# Patient Record
Sex: FEMALE | Race: WHITE | NOT HISPANIC OR LATINO | Employment: OTHER | URBAN - NONMETROPOLITAN AREA
[De-identification: names, ages, dates, MRNs, and addresses within clinical notes are randomized per-mention and may not be internally consistent; named-entity substitution may affect disease eponyms.]

---

## 2017-03-23 RX ORDER — PANTOPRAZOLE SODIUM 40 MG/1
TABLET, DELAYED RELEASE ORAL
Qty: 30 TABLET | Refills: 11 | OUTPATIENT
Start: 2017-03-23

## 2017-06-21 ENCOUNTER — OFFICE VISIT (OUTPATIENT)
Dept: GASTROENTEROLOGY | Facility: CLINIC | Age: 72
End: 2017-06-21

## 2017-06-21 VITALS
SYSTOLIC BLOOD PRESSURE: 142 MMHG | TEMPERATURE: 97.6 F | WEIGHT: 142 LBS | HEART RATE: 63 BPM | BODY MASS INDEX: 26.13 KG/M2 | DIASTOLIC BLOOD PRESSURE: 68 MMHG | HEIGHT: 62 IN | OXYGEN SATURATION: 98 %

## 2017-06-21 DIAGNOSIS — K22.2 SCHATZKI'S RING: ICD-10-CM

## 2017-06-21 DIAGNOSIS — Z12.11 COLON CANCER SCREENING: Primary | ICD-10-CM

## 2017-06-21 DIAGNOSIS — R13.19 OTHER DYSPHAGIA: ICD-10-CM

## 2017-06-21 PROBLEM — R13.10 DYSPHAGIA: Status: ACTIVE | Noted: 2017-06-21

## 2017-06-21 PROCEDURE — 99213 OFFICE O/P EST LOW 20 MIN: CPT | Performed by: NURSE PRACTITIONER

## 2017-06-21 RX ORDER — SODIUM, POTASSIUM,MAG SULFATES 17.5-3.13G
2 SOLUTION, RECONSTITUTED, ORAL ORAL ONCE
Qty: 2 BOTTLE | Refills: 0 | Status: SHIPPED | OUTPATIENT
Start: 2017-06-21 | End: 2017-06-21

## 2017-06-21 RX ORDER — LISINOPRIL 10 MG/1
20 TABLET ORAL
COMMUNITY
Start: 2016-12-22 | End: 2021-06-02

## 2017-06-21 RX ORDER — ROSUVASTATIN CALCIUM 20 MG/1
20 TABLET, COATED ORAL
COMMUNITY
Start: 2017-05-15 | End: 2021-06-02

## 2017-06-21 RX ORDER — AMLODIPINE BESYLATE 5 MG/1
5 TABLET ORAL
COMMUNITY
Start: 2017-01-27 | End: 2018-01-28

## 2017-06-21 RX ORDER — ESCITALOPRAM OXALATE 10 MG/1
TABLET ORAL
COMMUNITY
Start: 2017-04-25 | End: 2018-08-27

## 2017-06-21 NOTE — PROGRESS NOTES
"Chief Complaint   Patient presents with   • Colon Cancer Screening     Patient is here today for colon screening and she is starting to havinf trouble swallowing.   • Difficulty Swallowing     Subjective   HPI  Arlene England is a 71 y.o. female who presents as a referral for preventative maintenance. She has no complaints of nausea or vomiting. No change in bowels. No wt loss. No BRBPR. No melena. The patient is adopted and does not know family history.  No abdominal pain. The patient last colonoscopy was in [place] 2012 normal.    The patient does tell me additionally she has been having difficulty swallowing again.  According to records on 3/14/16 the patient underwent an upper endoscopy for dysphagia after having been seen in the ER with a recent food impaction that resolved with glucagon.  She was found to have a moderate Schatzki ring that was dilated and a small hiatal hernia.  The patient states that her symptoms of feeling as though food gets \"stuck\" in her mid esophageal area most specifically with food such as solids.  She denies any nausea, vomiting, epigastric pain, pyrosis, hematemesis or odynophagia.    Past Medical History:   Diagnosis Date   • Dysphagia      Past Surgical History:   Procedure Laterality Date   • CHOLECYSTECTOMY     • HYSTERECTOMY     • TOTAL KNEE ARTHROPLASTY     • UPPER GASTROINTESTINAL ENDOSCOPY  03/14/2016       Current Outpatient Prescriptions:   •  amLODIPine (NORVASC) 5 MG tablet, Take 5 mg by mouth., Disp: , Rfl:   •  escitalopram (LEXAPRO) 10 MG tablet, TAKE ONE TABLET BY MOUTH ONCE DAILY, Disp: , Rfl:   •  lisinopril (PRINIVIL,ZESTRIL) 10 MG tablet, Take 20 mg by mouth., Disp: , Rfl:   •  rosuvastatin (CRESTOR) 20 MG tablet, Take 20 mg by mouth., Disp: , Rfl:   •  sodium-potassium-magnesium sulfates (SUPREP BOWEL PREP KIT) 17.5-3.13-1.6 GM/180ML solution oral solution, Take 2 bottles by mouth 1 (One) Time for 1 dose. Split dose prep as directed by office instructions " "provided.  2 bottles = one kit., Disp: 2 bottle, Rfl: 0  Allergies   Allergen Reactions   • Sulfa Antibiotics Rash     Social History     Social History   • Marital status:      Spouse name: N/A   • Number of children: N/A   • Years of education: N/A     Occupational History   • Not on file.     Social History Main Topics   • Smoking status: Never Smoker   • Smokeless tobacco: Never Used   • Alcohol use Yes   • Drug use: Not on file   • Sexual activity: Not on file     Other Topics Concern   • Not on file     Social History Narrative     History reviewed. No pertinent family history.    REVIEW OF SYSTEMS  General: well appearing, no fever chills or sweats, no unexplained wt loss  HEENT: no acute visual or hearing disturbances  Cardiovascular: No chest pain or palpitations  Pulmonary: No shortness of breath, coughing, wheezing or hemoptysis  : No burning, urgency, hematuria, or dysuria  Musculoskeletal: No joint pain or stiffness  Peripheral: no edema  Skin: No lesions or rashes  Neuro: No dizziness, headaches, stroke, syncope  Endocrine: No hot or cold intolerances  Hematological: No blood dyscrasias    Objective   Vitals:    06/21/17 1442   BP: 142/68   Pulse: 63   Temp: 97.6 °F (36.4 °C)   SpO2: 98%   Weight: 142 lb (64.4 kg)   Height: 62\" (157.5 cm)     Body mass index is 25.97 kg/(m^2).    PHYSICAL EXAM  General: age appropriate well nourished well appearing, no acute distress  Head: normocephalic and atraumatic  Global assessment-supple  Neck-No JVD noted, no lymphadenopathy  Pulmonary-clear to auscultation bilaterally, normal respiratory effort  Cardiovascular-normal rate and rhythm, normal heart sounds, S1 and S2 noted  Abdomen-soft, non tender, non distended, normal bowel sounds all 4 quadrants, no hepatosplenomegaly noted  Extremities-No clubbing cyanosis or edema  Neuro-Non focal, converses appropriately, awake, alert, oriented    Imaging Results (most recent)     None        Assessment/Plan "   Arlene was seen today for colon cancer screening and difficulty swallowing.    Diagnoses and all orders for this visit:    Colon cancer screening  -     Case Request; Standing  -     Case RequestColonoscopy    Other dysphagia  -     Case Request; Standing  -     Case Request upper endoscopy  I have discussed with the patient to avoid foods that are solid such as meat and bread.  I have urged her to eat small amounts.    Chester's ring  Comments:  Hx of 2016 with food impaction  Orders:  -     Case Request; Standing  -     Case Request    Other orders  -     Implement Anesthesia Orders Day of Procedure; Standing  -     Obtain Informed Consent; Standing  -     Verify bowel prep was successful; Standing  -     sodium-potassium-magnesium sulfates (SUPREP BOWEL PREP KIT) 17.5-3.13-1.6 GM/180ML solution oral solution; Take 2 bottles by mouth 1 (One) Time for 1 dose. Split dose prep as directed by office instructions provided.  2 bottles = one kit.      ESOPHAGOGASTRODUODENOSCOPY WITH ANESTHESIA (N/A), COLONOSCOPY WITH ANESTHESIA (N/A)  No Follow-up on file.  There are no Patient Instructions on file for this visit.    All risks, benefits, alternatives, and indications of colonoscopy procedure have been discussed with the patient. Risks to include perforation of the colon requiring possible surgery or colostomy, risk of bleeding from biopsies or removal of colon tissue, possibility of missing a colon polyp or cancer, or adverse drug reaction.  Benefits to include the diagnosis and management of disease of the colon and rectum. Alternatives to include barium enema, radiographic evaluation, lab testing or no intervention. Pt verbalizes understanding and agrees.     The risks, benefits, and alternatives of endoscopy were reviewed with the patient today.  Risks including perforation, with or without dilation, possibly requiring surgery.  Additional risks include risk of bleeding.  There is also the risk of a drug reaction  or problems with anesthesia.  This will be discussed with the further by the anesthesia team on the day of the procedure. The benefits include the diagnosis and management of disease of the upper digestive tract.  Alternatives to endoscopy include upper GI series, laboratory testing, radiographic evaluation, or no intervention.  The patient verbalizes understanding and agrees.

## 2017-08-14 ENCOUNTER — HOSPITAL ENCOUNTER (OUTPATIENT)
Facility: HOSPITAL | Age: 72
Setting detail: HOSPITAL OUTPATIENT SURGERY
Discharge: HOME | End: 2017-08-14
Attending: INTERNAL MEDICINE | Admitting: INTERNAL MEDICINE

## 2017-08-14 ENCOUNTER — ANESTHESIA (OUTPATIENT)
Dept: GASTROENTEROLOGY | Facility: HOSPITAL | Age: 72
End: 2017-08-14

## 2017-08-14 ENCOUNTER — ANESTHESIA EVENT (OUTPATIENT)
Dept: GASTROENTEROLOGY | Facility: HOSPITAL | Age: 72
End: 2017-08-14

## 2017-08-14 ENCOUNTER — TELEPHONE (OUTPATIENT)
Dept: GASTROENTEROLOGY | Facility: CLINIC | Age: 72
End: 2017-08-14

## 2017-08-14 VITALS
OXYGEN SATURATION: 100 % | RESPIRATION RATE: 19 BRPM | TEMPERATURE: 98.3 F | DIASTOLIC BLOOD PRESSURE: 40 MMHG | HEART RATE: 63 BPM | HEIGHT: 62 IN | SYSTOLIC BLOOD PRESSURE: 118 MMHG

## 2017-08-14 PROCEDURE — C1726 CATH, BAL DIL, NON-VASCULAR: HCPCS | Performed by: INTERNAL MEDICINE

## 2017-08-14 PROCEDURE — G0105 COLORECTAL SCRN; HI RISK IND: HCPCS | Performed by: INTERNAL MEDICINE

## 2017-08-14 PROCEDURE — 43249 ESOPH EGD DILATION <30 MM: CPT | Performed by: INTERNAL MEDICINE

## 2017-08-14 PROCEDURE — 25010000002 PROPOFOL 10 MG/ML EMULSION: Performed by: NURSE ANESTHETIST, CERTIFIED REGISTERED

## 2017-08-14 RX ORDER — PROPOFOL 10 MG/ML
VIAL (ML) INTRAVENOUS AS NEEDED
Status: DISCONTINUED | OUTPATIENT
Start: 2017-08-14 | End: 2017-08-14 | Stop reason: SURG

## 2017-08-14 RX ORDER — SODIUM CHLORIDE 0.9 % (FLUSH) 0.9 %
3 SYRINGE (ML) INJECTION AS NEEDED
Status: DISCONTINUED | OUTPATIENT
Start: 2017-08-14 | End: 2017-08-14 | Stop reason: HOSPADM

## 2017-08-14 RX ORDER — LIDOCAINE HYDROCHLORIDE 10 MG/ML
0.5 INJECTION, SOLUTION INFILTRATION; PERINEURAL ONCE AS NEEDED
Status: DISCONTINUED | OUTPATIENT
Start: 2017-08-14 | End: 2017-08-14 | Stop reason: CLARIF

## 2017-08-14 RX ORDER — ASPIRIN 325 MG
325 TABLET ORAL DAILY
COMMUNITY
End: 2021-06-02

## 2017-08-14 RX ORDER — SODIUM CHLORIDE 9 MG/ML
500 INJECTION, SOLUTION INTRAVENOUS CONTINUOUS PRN
Status: DISCONTINUED | OUTPATIENT
Start: 2017-08-14 | End: 2017-08-14 | Stop reason: HOSPADM

## 2017-08-14 RX ORDER — LIDOCAINE HYDROCHLORIDE 20 MG/ML
INJECTION, SOLUTION INFILTRATION; PERINEURAL AS NEEDED
Status: DISCONTINUED | OUTPATIENT
Start: 2017-08-14 | End: 2017-08-14 | Stop reason: SURG

## 2017-08-14 RX ADMIN — PROPOFOL 50 MG: 10 INJECTION, EMULSION INTRAVENOUS at 11:40

## 2017-08-14 RX ADMIN — PROPOFOL 30 MG: 10 INJECTION, EMULSION INTRAVENOUS at 12:04

## 2017-08-14 RX ADMIN — PROPOFOL 30 MG: 10 INJECTION, EMULSION INTRAVENOUS at 12:06

## 2017-08-14 RX ADMIN — PROPOFOL 30 MG: 10 INJECTION, EMULSION INTRAVENOUS at 11:47

## 2017-08-14 RX ADMIN — SODIUM CHLORIDE 500 ML: 9 INJECTION, SOLUTION INTRAVENOUS at 11:19

## 2017-08-14 RX ADMIN — PROPOFOL 30 MG: 10 INJECTION, EMULSION INTRAVENOUS at 11:49

## 2017-08-14 RX ADMIN — PROPOFOL 30 MG: 10 INJECTION, EMULSION INTRAVENOUS at 11:52

## 2017-08-14 RX ADMIN — PROPOFOL 30 MG: 10 INJECTION, EMULSION INTRAVENOUS at 11:54

## 2017-08-14 RX ADMIN — SODIUM CHLORIDE: 9 INJECTION, SOLUTION INTRAVENOUS at 11:36

## 2017-08-14 RX ADMIN — PROPOFOL 30 MG: 10 INJECTION, EMULSION INTRAVENOUS at 12:02

## 2017-08-14 RX ADMIN — PROPOFOL 30 MG: 10 INJECTION, EMULSION INTRAVENOUS at 11:48

## 2017-08-14 RX ADMIN — PROPOFOL 40 MG: 10 INJECTION, EMULSION INTRAVENOUS at 11:44

## 2017-08-14 RX ADMIN — PROPOFOL 30 MG: 10 INJECTION, EMULSION INTRAVENOUS at 11:58

## 2017-08-14 RX ADMIN — PROPOFOL 30 MG: 10 INJECTION, EMULSION INTRAVENOUS at 11:50

## 2017-08-14 RX ADMIN — PROPOFOL 30 MG: 10 INJECTION, EMULSION INTRAVENOUS at 11:43

## 2017-08-14 RX ADMIN — PROPOFOL 30 MG: 10 INJECTION, EMULSION INTRAVENOUS at 11:56

## 2017-08-14 RX ADMIN — PROPOFOL 30 MG: 10 INJECTION, EMULSION INTRAVENOUS at 11:45

## 2017-08-14 RX ADMIN — PROPOFOL 30 MG: 10 INJECTION, EMULSION INTRAVENOUS at 12:00

## 2017-08-14 RX ADMIN — PROPOFOL 30 MG: 10 INJECTION, EMULSION INTRAVENOUS at 11:46

## 2017-08-14 RX ADMIN — PROPOFOL 50 MG: 10 INJECTION, EMULSION INTRAVENOUS at 11:42

## 2017-08-14 RX ADMIN — LIDOCAINE HYDROCHLORIDE 40 MG: 20 INJECTION, SOLUTION INFILTRATION; PERINEURAL at 11:40

## 2017-08-14 RX ADMIN — PROPOFOL 30 MG: 10 INJECTION, EMULSION INTRAVENOUS at 11:51

## 2017-08-14 NOTE — ANESTHESIA PREPROCEDURE EVALUATION
Anesthesia Evaluation     Patient summary reviewed   no history of anesthetic complications:  NPO Solid Status: > 8 hours  NPO Liquid Status: > 8 hours     Airway   Mallampati: II  TM distance: >3 FB  Neck ROM: full  Dental - normal exam     Pulmonary - negative pulmonary ROS   (-) asthma, sleep apnea  Cardiovascular   Exercise tolerance: good (4-7 METS)    (+) hypertension, hyperlipidemia  (-) angina      Neuro/Psych  (+) CVA (10/2016, on residual),    (-) seizures, TIA  GI/Hepatic/Renal/Endo    (-) liver disease, no renal disease, diabetes    Musculoskeletal     Abdominal    Substance History      OB/GYN          Other                                        Anesthesia Plan    ASA 3     general   total IV anesthesia  intravenous induction   Anesthetic plan and risks discussed with patient.

## 2017-08-14 NOTE — H&P
"    Chief Complaint:   Dysphagia and likely history of colon polyps    Subjective     HPI:   The patient has a history of dysphagia.  She has been dilated in the past.  She is now having symptoms once again.    She also had a colonoscopy done at outside institution in 2015.  They called her for repeat colonoscopy.  This makes me suspect that she had colon polyps.  The patient admits that she has had a light stroke and has problems with her memory.    Past Medical History:   Past Medical History:   Diagnosis Date   • Dysphagia        Past Surgical History:  [unfilled]    Family History:  History reviewed. No pertinent family history.    Social History:   reports that she has never smoked. She has never used smokeless tobacco. She reports that she drinks alcohol.    Medications:   Prescriptions Prior to Admission   Medication Sig Dispense Refill Last Dose   • amLODIPine (NORVASC) 5 MG tablet Take 5 mg by mouth.   8/14/2017 at 0700   • aspirin 325 MG tablet Take 325 mg by mouth Daily.   8/13/2017 at 0700   • escitalopram (LEXAPRO) 10 MG tablet TAKE ONE TABLET BY MOUTH ONCE DAILY   8/14/2017 at 0700   • lisinopril (PRINIVIL,ZESTRIL) 10 MG tablet Take 20 mg by mouth.   8/14/2017 at 0700   • rosuvastatin (CRESTOR) 20 MG tablet Take 20 mg by mouth.   8/13/2017 at 2100       Allergies:  Sulfa antibiotics    ROS:    General: Weight stable  Resp: No SOA  Cardiovascular: No CP      Objective     /66 (BP Location: Right arm, Patient Position: Sitting)  Pulse 59  Temp 98.3 °F (36.8 °C) (Temporal Artery )   Resp 20  Ht 62\" (157.5 cm)  LMP Comment: hysterectomy  SpO2 99%    Physical Exam   Constitutional: Pt is oriented to person, place, and in no distress.  Eyes: No icterus  ENMT: Unremarkable   Cardiovascular: Normal rate, regular rhythm.    Pulmonary/Chest: No distress.  No audible wheezes  Abdominal: Soft.   Skin: Skin is warm and dry.   Psychiatric: Mood, memory, affect and judgment appear normal.     Assessment/Plan "     Diagnosis:  Dysphagia  History of colon polyps    Anticipated Surgical Procedure:  Endoscopy and colonoscopy    The risks, benefits, and alternatives of endoscopy were reviewed with the patient today.  Risks including perforation, with or without dilation, possibly requiring surgery.  Additional risks include risk of bleeding.  There is also the risk of a drug reaction or problems with anesthesia.  This will be discussed with the further by the anesthesia team on the day of the procedure. The benefits include the diagnosis and management of disease of the upper digestive tract.  Alternatives to endoscopy include upper GI series, laboratory testing, radiographic evaluation, or no intervention.  The patient verbalizes understanding and agrees.    The risks, benefits, and alternatives of colonoscopy were reviewed with the patient today.  Risks including perforation of the colon possibly requiring surgery or colostomy.  Additional risks include risk of bleeding from biopsies or removal of colon tissue.  There is also the risk of a drug reaction or problems with anesthesia.  This will be discussed with the further by the anesthesia team on the day of the procedure.  Lastly there is a possibility of missing a colon polyp or cancer.  The benefits include the diagnosis and management of disease of the colon and rectum.  Alternatives to colonoscopy include barium enema, laboratory testing, radiographic evaluation, or no intervention.  The patient verbalizes understanding and agrees.    Much of this encounter note is an electronic transcription/translation of spoken language to printed text. The electronic translation of spoken language may permit erroneous, or at times, nonsensical words or phrases to be inadvertently transcribed; although I have reviewed the note for such errors, some may still exist.

## 2017-08-14 NOTE — TELEPHONE ENCOUNTER
Please work with the patient on getting records from Dr. Lovell office regarding her last colonoscopy.  If there were no polyps, then recall colonoscopy in 10 years.  There were polyps, then repeat colonoscopy in 5 years.    Lindsay Blair MD

## 2017-08-14 NOTE — ANESTHESIA POSTPROCEDURE EVALUATION
Patient: Arlene England    Procedure Summary     Date Anesthesia Start Anesthesia Stop Room / Location    08/14/17 1136 1204 [facility] ENDOSCOPY 5 / [facility]       Procedure Diagnosis Surgeon Provider    ESOPHAGOGASTRODUODENOSCOPY WITH ANESTHESIA (N/A Esophagus); COLONOSCOPY WITH ANESTHESIA (N/A ) Schatzki's ring; Other dysphagia; Colon cancer screening  (Schatzki's ring [K22.2]; Other dysphagia [R13.19]; Colon cancer screening [Z12.11]) MD Eze Durbin CRNA          Anesthesia Type: general  Last vitals  BP        Temp        Pulse       Resp        SpO2          Post Anesthesia Care and Evaluation    Patient location during evaluation: PHASE II  Patient participation: complete - patient participated  Level of consciousness: awake  Pain score: 0  Pain management: adequate  Airway patency: patent  Anesthetic complications: No anesthetic complications  PONV Status: none  Cardiovascular status: acceptable  Respiratory status: acceptable  Hydration status: acceptable

## 2017-08-14 NOTE — PLAN OF CARE
Problem: Patient Care Overview (Adult)  Goal: Plan of Care Review  Outcome: Outcome(s) achieved Date Met:  08/14/17 08/14/17 1234   Coping/Psychosocial Response Interventions   Plan Of Care Reviewed With patient;spouse   Patient Care Overview   Progress progress toward functional goals as expected   Outcome Evaluation   Outcome Summary/Follow up Plan d/c criteria met

## 2017-08-14 NOTE — PLAN OF CARE
Problem: GI Endoscopy (Adult)  Goal: Signs and Symptoms of Listed Potential Problems Will be Absent or Manageable (GI Endoscopy)  Outcome: Outcome(s) achieved Date Met:  08/14/17

## 2017-08-14 NOTE — TELEPHONE ENCOUNTER
Her last colonoscopy was done 12/2/2013 and there was 1 tubular adenoma negative for dysplasia. The report and path has been faxed to us.     I put recall in system for 5 years    [Dr. Boateng [phone], [facility]]

## 2018-07-03 ENCOUNTER — OFFICE VISIT (OUTPATIENT)
Dept: NEUROSURGERY | Age: 73
End: 2018-07-03
Payer: MEDICARE

## 2018-07-03 VITALS
WEIGHT: 151 LBS | SYSTOLIC BLOOD PRESSURE: 156 MMHG | BODY MASS INDEX: 27.79 KG/M2 | HEIGHT: 62 IN | DIASTOLIC BLOOD PRESSURE: 62 MMHG | HEART RATE: 70 BPM

## 2018-07-03 DIAGNOSIS — Z86.73 HISTORY OF CVA (CEREBROVASCULAR ACCIDENT): Primary | ICD-10-CM

## 2018-07-03 PROBLEM — K22.2 SCHATZKI'S RING: Status: ACTIVE | Noted: 2017-06-21

## 2018-07-03 PROBLEM — Z12.11 COLON CANCER SCREENING: Status: ACTIVE | Noted: 2017-06-21

## 2018-07-03 PROBLEM — R13.10 DYSPHAGIA: Status: ACTIVE | Noted: 2017-06-21

## 2018-07-03 PROCEDURE — 99204 OFFICE O/P NEW MOD 45 MIN: CPT | Performed by: NURSE PRACTITIONER

## 2018-07-03 RX ORDER — ROSUVASTATIN CALCIUM 20 MG/1
20 TABLET, COATED ORAL DAILY
COMMUNITY
Start: 2018-06-05

## 2018-07-03 RX ORDER — ASPIRIN 325 MG
325 TABLET ORAL DAILY
COMMUNITY
End: 2018-11-01 | Stop reason: DRUGHIGH

## 2018-07-03 RX ORDER — ESCITALOPRAM OXALATE 10 MG/1
10 TABLET ORAL DAILY
COMMUNITY
Start: 2017-08-24

## 2018-07-03 RX ORDER — LISINOPRIL 10 MG/1
20 TABLET ORAL DAILY
COMMUNITY
Start: 2017-11-27 | End: 2020-03-03 | Stop reason: DRUGHIGH

## 2018-07-03 NOTE — PROGRESS NOTES
[facility] Neurology Office Note      Patient:   Verner Gust  MR#:    [id number]  Account Number:                         YOB: 1945  Date of Evaluation:  7/3/2018  Time of Note:                          3:28 PM  Primary/Referring Physician:  Solo Mccloud   Consulting Physician:  BARRY Chauhan    NEW PATIENT CONSULTATION    Chief Complaint   Patient presents with    New Patient     Ref. Dr Chana Patel Transient Ischemic Attack     Pt states she had 2 previous strokes in 2016, Pt is needing to establish care, no previous f/u from stroke.  Memory Loss     Pt and family states worsening memory since strokes. HISTORY OF PRESENT ILLNESS    Verner Gust is a 67y.o. year old female here to establish care. Prior history with TIA in 2016. She reports symptoms included right sided weakness and slurred speech. Symptoms resolved over several hours. No residual noted. No other stroke like symptoms noted since. Denies heart racing or palpitations. Has noticed short term memory loss. Difficulty with remembering names, forgets where she puts things down. No overt issues with ADLs, handles finances without difficulty, manages own medications. No urinary incontinence or gait changes. Checks blood pressure every morning and it usually runs normal. Family history with aunt having dementia. No history of atrial fibrillation. Past Medical History:   Diagnosis Date    Anxiety     High cholesterol     HTN (hypertension)     TIA (transient ischemic attack)        Past Surgical History:   Procedure Laterality Date    CATARACT REMOVAL WITH IMPLANT      CHOLECYSTECTOMY      JOINT REPLACEMENT Bilateral     PARTIAL HYSTERECTOMY         History reviewed. No pertinent family history. Social History     Social History    Marital status:      Spouse name: N/A    Number of children: N/A    Years of education: N/A     Occupational History    Not on file.      Social History Main Topics    Smoking status: Never Smoker    Smokeless tobacco: Never Used    Alcohol use Yes      Comment: occ    Drug use: No    Sexual activity: Not on file     Other Topics Concern    Not on file     Social History Narrative    No narrative on file       Current Outpatient Prescriptions   Medication Sig Dispense Refill    aspirin 325 MG tablet Take 325 mg by mouth daily      rosuvastatin (CRESTOR) 20 MG tablet Take 20 mg by mouth daily      escitalopram (LEXAPRO) 10 MG tablet Take 10 mg by mouth daily      lisinopril (PRINIVIL;ZESTRIL) 10 MG tablet Take 20 mg by mouth daily       No current facility-administered medications for this visit.         Allergies   Allergen Reactions    Sulfa Antibiotics Rash         REVIEW OF SYSTEMS  Constitutional: []Fever []Sweat []Chills [] Recent Injury [x] Denies all unless marked  HEENT:[]Headache  [] Head Injury/Hearing Loss  [] Sore Throat  [] Ear Ache/Dizziness  [x] Denies all unless marked  Spine:  [] Neck pain  [x] Back pain  [] Sciaticia  [x] Denies all unless marked  Cardiovascular:[]Heart Disease []Chest Pain [] Palpitations  [x] Denies all unless marked  Pulmonary: []Shortness of Breath []Cough   [x] Denies all unless marke  Gastrointestinal: []Nausea  []Vomiting  []Abdominal Pain  []Constipation  []Diarrhea  []Dark Bloody Stools  [x] Denies all unless marked  Psychiatric/Behavioral:[] Depression [] Anxiety [x] Denies all unless marked  Genitourinary:   [] Frequency  [] Urgency  [] Incontinence [] Pain with Urination  [x] Denies all unless marked  Extremities: []Pain  []Swelling  [x] Denies all unless marked  Musculoskeletal: [] Muscle Pain  [] Joint Pain  [] Arthritis [] Muscle Cramps [] Muscle Twitches  [x] Denies all unless marked  Sleep: [] Insomnia [] Snoring [] Restless Legs [] Sleep Apnea  [] Daytime Sleepiness  [x] Denies all unless marked  Skin:[] Rash [] Skin Discoloration [x] Denies all unless marked   Neurological: [x]Visual Disturbance/Memory Loss [x] Loss of Balance [] Slurred Speech/Weakness [] Seizures  [] Vertigo/Dizziness [x] Denies all unless marked    The MA has completed the ROS with the patient. I have reviewed it in its' entirety with the patient and agree with the documentation. PHYSICAL EXAM  BP (!) 156/62   Pulse 70   Ht 5' 2\" (1.575 m)   Wt 151 lb (68.5 kg)   BMI 27.62 kg/m²       Constitutional  No acute distress    HEENT- Conjunctiva normal.  No scars, masses, or lesions over external nose or ears, no neck masses noted, no jugular vein distension, no bruit  Pulmonary- Good expansion, normal effort without use of accessory muscles  Musculoskeletal  No significant wasting of muscles noted, no bony deformities  Extremities - No clubbing, cyanosis or edema  Skin  Warm, dry, and intact. No rash, erythema, or pallor  Psychiatric  Mood, affect, and behavior appear normal      NEUROLOGICAL EXAM     Mental status   [x]Awake, alert, oriented   [x]Affect attention and concentration appear appropriate  [x]Recent and remote memory appears unremarkable  [x]Speech normal without dysarthria or aphasia, comprehension and repetition intact. COMMENTS: MoCA 27/30    Cranial Nerves [x]No VF deficit to confrontation,  no papilledema on fundoscopic exam.  [x]PERRLA, EOMI, no nystagmus, conjugate eye movements, no ptosis  [x]Face symmetric  [x]Facial sensation intact  [x]Tongue midline no atrophy or fasciculations present  [x]Palate midline, hearing to finger rub normal bilaterally  [x]Shoulder shrug and SCM testing normal bilaterally  COMMENTS:   Motor   [x]5/5 strength x 4 extremities  [x]Normal bulk and tone  [x]No tremor present  [x]No rigidity or bradykinesia noted  COMMENTS:   Sensory  [x]Sensation intact to light touch, pin prick, vibration, and proprioception BLE  [x]Sensation intact to light touch, pin prick, vibration, and proprioception BUE  COMMENTS:   Coordination [x]FTN normal bilaterally   [x]HTS normal bilaterally  [x]JORDY normal bilaterally. COMMENTS:   Reflexes  [x]Symmetric and non-pathological  [x]Toes down going bilaterally  [x]No clonus present  COMMENTS:   Gait                  [x]Normal steady gait    []Ataxic    []Spastic     []Magnetic     []Shuffling  COMMENTS:       LABS RECORD AND IMAGING REVIEW (As below and per HPI)    No results found for: [id number]  No results found for: WBC, HGB, HCT, MCV, PLT  No results found for: NA, K, CL, CO2, BUN, CREATININE, GLUCOSE, CALCIUM, PROT, LABALBU, BILITOT, ALKPHOS, AST, ALT, LABGLOM, GFRAA, AGRATIO, GLOB  No results found for: CHOL, TRIG, HDL, LDLCALC  No results found for: TSH, T4FREE  No results found for: CRP, SEDRATE     PCP records reviewed     Hospital records pending. ASSESSMENT:    Dayron Calderon is a 67y.o. year old female here to establish care. She reports a previous history of stroke. Per family, she was told she had a lacunar stroke. She has no residual symptoms. Exam today is non-focal. Record review is pending at this time. Pending this, may recommend decreasing ASA to 81mg. Should continue stroke risk factor maximization with anti-hypertensive, statin and ASA. ICD-10-CM ICD-9-CM    1. History of CVA (cerebrovascular accident) Z80.78 V12.54      PLAN:  1. Continue stroke risk factor maximization with ASA, anti hypertensive and lipids   2. Records from [facility] [place] from prior stroke admission   3. Further testing as indicated by records review   4. Return in about 6 months (around 1/3/2019) for follow up, sooner if worsening. BARRY Stahl    Note:  A total of >50% (>23 minutes) of 45 minutes was spent discussing the pathophysiology and treatment and/or coordination of care of the above diagnoses.

## 2018-08-02 PROBLEM — Z12.11 COLON CANCER SCREENING: Status: RESOLVED | Noted: 2017-06-21 | Resolved: 2018-08-02

## 2018-08-27 ENCOUNTER — OFFICE VISIT (OUTPATIENT)
Dept: GASTROENTEROLOGY | Facility: CLINIC | Age: 73
End: 2018-08-27

## 2018-08-27 VITALS
TEMPERATURE: 98 F | WEIGHT: 150 LBS | DIASTOLIC BLOOD PRESSURE: 66 MMHG | SYSTOLIC BLOOD PRESSURE: 138 MMHG | HEIGHT: 62 IN | BODY MASS INDEX: 27.6 KG/M2 | OXYGEN SATURATION: 98 % | HEART RATE: 68 BPM

## 2018-08-27 DIAGNOSIS — R13.19 OTHER DYSPHAGIA: Primary | ICD-10-CM

## 2018-08-27 DIAGNOSIS — K22.2 SCHATZKI'S RING: ICD-10-CM

## 2018-08-27 PROCEDURE — 99213 OFFICE O/P EST LOW 20 MIN: CPT | Performed by: NURSE PRACTITIONER

## 2018-08-27 RX ORDER — LORAZEPAM 1 MG/1
1 TABLET ORAL EVERY 8 HOURS PRN
COMMUNITY
End: 2021-06-02

## 2018-08-27 NOTE — PROGRESS NOTES
"Chief Complaint:   Chief Complaint   Patient presents with   • Difficulty Swallowing     Patient is here today stating that food gets stuck at bottom of her esophagus and sometimes it gets hungs and it comes back up.         Patient ID: Arlene England is a 73 y.o. female     History of Present Illness: This is a very pleasant 73-year-old female who tells me that she has begun to have difficulty swallowing again.  She states that she is having food most especially meat get \"stuck\" in the mid esophageal area.  She states that she is not having any difficulty swallowing food that is soft or liquids.  She states at times she does have to make herself vomit in order to expel the bolus of food.    The patient denies any nausea, vomiting, epigastric pain, dysphagia, pyrosis or hematemesis.  The patient denies any fever or chills.  Denies any melena or hematochezia.  Denies any unintentional weight loss or loss of appetite.  She denies any chronic NSAID use.    The patient's last EGD was performed on 8/14/17 for dysphagia  Findings:  A small hiatus hernia was present. The esophagus was normal.  A moderate Schatzki ring (acquired) was found at the gastroesophageal junction. A TTS dilator was passed through the scope. Dilation with a 12-13.5-15 mm balloon (to a maximum balloon size of 15 mm) dilator was performed.  The stomach was normal. The cardia and gastric fundus were normal on retroflexion.  The examined duodenum was normal.    The patient was instructed by  that should she began to have difficulties swallowing again that she may very well need a repeat EGD for further dilation.    Past Medical History:   Diagnosis Date   • Dysphagia    • Hyperlipidemia    • Hypertension        Past Surgical History:   Procedure Laterality Date   • CHOLECYSTECTOMY     • COLONOSCOPY N/A 8/14/2017    Procedure: COLONOSCOPY WITH ANESTHESIA;  Surgeon: Lindsay Blair MD;  Location: [facility] ENDOSCOPY;  Service:    • ENDOSCOPY N/A 8/14/2017 " "   Procedure: ESOPHAGOGASTRODUODENOSCOPY WITH ANESTHESIA;  Surgeon: Lindsay Blair MD;  Location: [facility] ENDOSCOPY;  Service:    • HYSTERECTOMY     • TOTAL KNEE ARTHROPLASTY     • UPPER GASTROINTESTINAL ENDOSCOPY  03/14/2016         Current Outpatient Prescriptions:   •  aspirin 325 MG tablet, Take 325 mg by mouth Daily., Disp: , Rfl:   •  lisinopril (PRINIVIL,ZESTRIL) 10 MG tablet, Take 20 mg by mouth., Disp: , Rfl:   •  LORazepam (ATIVAN) 1 MG tablet, Take 1 mg by mouth Every 8 (Eight) Hours As Needed for Anxiety., Disp: , Rfl:   •  rosuvastatin (CRESTOR) 20 MG tablet, Take 20 mg by mouth., Disp: , Rfl:     Allergies   Allergen Reactions   • Sulfa Antibiotics Rash       Social History     Social History   • Marital status:      Spouse name: N/A   • Number of children: N/A   • Years of education: N/A     Occupational History   • Not on file.     Social History Main Topics   • Smoking status: Never Smoker   • Smokeless tobacco: Never Used   • Alcohol use Yes      Comment: occasional wine   • Drug use: Unknown   • Sexual activity: Not on file     Other Topics Concern   • Not on file     Social History Narrative   • No narrative on file       Family History   Problem Relation Age of Onset   • Adopted: Yes       Vitals:    08/27/18 1315   BP: 138/66   Pulse: 68   Temp: 98 °F (36.7 °C)   SpO2: 98%   Weight: 68 kg (150 lb)   Height: 157.5 cm (62\")       Review of Systems:    General:    Present -feeling well   Skin:    Not Present-Rash   HEENT:     Not Present-Acute visual changes or Acute hearing changes   Neck :    Not Present- swollen glands   Genitourinary:      Not Present- burning, frequency, urgency hematuria, dysuria,   Cardiovascular:   Not Present-chest pain, palpitations, or pressure   Respiratory:   Not Present- shortness of breath or cough   Gastrointestinal:  Musculoskeletal:  Neurological:  Psychiatric:   Present as mentioned in the HP    Not Present. Recent gait disturbances.    Not " Present-Seizures and weakness in extremities.    Not Present- Anxiety or Depression.       Physical Exam:    General Appearance:    Alert, cooperative, in no acute distress   Psych:    Mood appropriate    Eyes:          conjunctivae and sclerae normal, no   icterus, no pallor   ENMT:    Ears appear intact with no abnormalities noted oral mucosa moist   Neck:   No adenopathy, supple, trachea midline, no thyromegaly, no   carotid bruit, no JVD    Cardiovascular:    Regular rhythm and normal rate, normal S1 and S2, no            murmur, no gallop, no rub, no click   Gastrointestinal:     Inspection normal.  Normal bowel sounds, no masses, no organomegaly, soft round non-tender, non-distended, no guarding, no rebound or tenderness. No hepatosplenomegaly.   Skin:   No bleeding, bruising or rash   Neurologic:   nonfocal       No results found for: WBC, HGB, HCT, PLT     No results found for: NA, K, CL, CO2, BUN, CREATININE, BILITOT, ALKPHOS, ALT, AST, GLUCOSE    No results found for: INR    Body mass index is 27.44 kg/m². Patient's Body mass index is 27.44 kg/m². BMI is below normal parameters. Recommendations include: no follow-up required.    Assessment and Plan:  Assessment/Plan   Arlene was seen today for difficulty swallowing.    Diagnoses and all orders for this visit:    Other dysphagia  -     Case Request; Standing  -     Case Request    Schatzki's ring  -     Case Request; Standing  -     Case Request    Other orders  -     Follow Anesthesia Guidelines / Standing Orders; Future  -     Implement Anesthesia Orders Day of Procedure; Standing  -     Obtain Informed Consent; Standing      The risks, benefits, and alternatives of endoscopy were reviewed with the patient today.  Risks including perforation, with or without dilation, possibly requiring surgery.  Additional risks include risk of bleeding.  There is also the risk of a drug reaction or problems with anesthesia.  This will be discussed with the further by  the anesthesia team on the day of the procedure. The benefits include the diagnosis and management of disease of the upper digestive tract.  Alternatives to endoscopy include upper GI series, laboratory testing, radiographic evaluation, or no intervention.  The patient verbalizes understanding and agrees.       There are no Patient Instructions on file for this visit.    Next follow-up appointment          EMR Dragon/Transcription disclaimer:  Much of this encounter note is an electronic transcription/translation of spoken language to printed text. The electronic translation of spoken language may permit erroneous, or at times, nonsensical words or phrases to be inadvertently transcribed; although I have reviewed the note for such errors, some may still exist.

## 2018-09-17 ENCOUNTER — HOSPITAL ENCOUNTER (OUTPATIENT)
Facility: HOSPITAL | Age: 73
Setting detail: HOSPITAL OUTPATIENT SURGERY
Discharge: HOME | End: 2018-09-17
Attending: INTERNAL MEDICINE | Admitting: INTERNAL MEDICINE

## 2018-09-17 ENCOUNTER — ANESTHESIA (OUTPATIENT)
Dept: GASTROENTEROLOGY | Facility: HOSPITAL | Age: 73
End: 2018-09-17

## 2018-09-17 ENCOUNTER — ANESTHESIA EVENT (OUTPATIENT)
Dept: GASTROENTEROLOGY | Facility: HOSPITAL | Age: 73
End: 2018-09-17

## 2018-09-17 VITALS
HEART RATE: 58 BPM | WEIGHT: 151 LBS | BODY MASS INDEX: 27.79 KG/M2 | DIASTOLIC BLOOD PRESSURE: 56 MMHG | RESPIRATION RATE: 16 BRPM | OXYGEN SATURATION: 96 % | HEIGHT: 62 IN | SYSTOLIC BLOOD PRESSURE: 124 MMHG | TEMPERATURE: 97 F

## 2018-09-17 PROCEDURE — 43249 ESOPH EGD DILATION <30 MM: CPT | Performed by: INTERNAL MEDICINE

## 2018-09-17 PROCEDURE — 25010000002 PROPOFOL 10 MG/ML EMULSION: Performed by: NURSE ANESTHETIST, CERTIFIED REGISTERED

## 2018-09-17 PROCEDURE — C1726 CATH, BAL DIL, NON-VASCULAR: HCPCS | Performed by: INTERNAL MEDICINE

## 2018-09-17 RX ORDER — SODIUM CHLORIDE 0.9 % (FLUSH) 0.9 %
3 SYRINGE (ML) INJECTION AS NEEDED
Status: DISCONTINUED | OUTPATIENT
Start: 2018-09-17 | End: 2018-09-17 | Stop reason: HOSPADM

## 2018-09-17 RX ORDER — LIDOCAINE HYDROCHLORIDE 20 MG/ML
INJECTION, SOLUTION INFILTRATION; PERINEURAL AS NEEDED
Status: DISCONTINUED | OUTPATIENT
Start: 2018-09-17 | End: 2018-09-17 | Stop reason: SURG

## 2018-09-17 RX ORDER — PROPOFOL 10 MG/ML
VIAL (ML) INTRAVENOUS AS NEEDED
Status: DISCONTINUED | OUTPATIENT
Start: 2018-09-17 | End: 2018-09-17 | Stop reason: SURG

## 2018-09-17 RX ORDER — SODIUM CHLORIDE 9 MG/ML
500 INJECTION, SOLUTION INTRAVENOUS CONTINUOUS PRN
Status: DISCONTINUED | OUTPATIENT
Start: 2018-09-17 | End: 2018-09-17 | Stop reason: HOSPADM

## 2018-09-17 RX ORDER — PANTOPRAZOLE SODIUM 40 MG/1
40 TABLET, DELAYED RELEASE ORAL DAILY
Qty: 30 TABLET | Refills: 11 | Status: SHIPPED | OUTPATIENT
Start: 2018-09-17 | End: 2018-10-29 | Stop reason: SDUPTHER

## 2018-09-17 RX ADMIN — LIDOCAINE HYDROCHLORIDE 50 MG: 20 INJECTION, SOLUTION INFILTRATION; PERINEURAL at 08:56

## 2018-09-17 RX ADMIN — PROPOFOL 100 MG: 10 INJECTION, EMULSION INTRAVENOUS at 08:56

## 2018-09-17 RX ADMIN — LIDOCAINE HYDROCHLORIDE 0.5 ML: 10 INJECTION, SOLUTION EPIDURAL; INFILTRATION; INTRACAUDAL; PERINEURAL at 07:59

## 2018-09-17 RX ADMIN — SODIUM CHLORIDE 500 ML: 9 INJECTION, SOLUTION INTRAVENOUS at 07:59

## 2018-09-17 NOTE — ANESTHESIA POSTPROCEDURE EVALUATION
Patient: Arlene England    Procedure Summary     Date:  09/17/18 Room / Location:  [facility] ENDOSCOPY 4 / [facility] ENDOSCOPY    Anesthesia Start:  0854 Anesthesia Stop:  0907    Procedure:  ESOPHAGOGASTRODUODENOSCOPY WITH ANESTHESIA (N/A ) Diagnosis:       Schatzki's ring      Other dysphagia      (Schatzki's ring [K22.2])      (Other dysphagia [R13.19])    Surgeon:  Lindsay Blair MD Provider:  Arnoldo Drake CRNA    Anesthesia Type:  general ASA Status:  3          Anesthesia Type: general  Last vitals  BP   133/85 (09/17/18 0915)   Temp   97 °F (36.1 °C) (09/17/18 0736)   Pulse   88 (09/17/18 0915)   Resp   20 (09/17/18 0915)     SpO2   97 % (09/17/18 0915)     Post Anesthesia Care and Evaluation    Patient location during evaluation: PHASE II  Level of consciousness: awake and alert  Pain management: adequate  Airway patency: patent  Anesthetic complications: No anesthetic complications    Cardiovascular status: acceptable  Respiratory status: acceptable  Hydration status: acceptable

## 2018-09-17 NOTE — ANESTHESIA PREPROCEDURE EVALUATION
Anesthesia Evaluation     Patient summary reviewed   no history of anesthetic complications:  NPO Solid Status: > 8 hours             Airway   Mallampati: II  TM distance: >3 FB  Neck ROM: full  Dental      Pulmonary - negative pulmonary ROS   Cardiovascular   Exercise tolerance: excellent (>7 METS)    (+) hypertension, hyperlipidemia,       Neuro/Psych  (+) TIA,     GI/Hepatic/Renal/Endo - negative ROS     Musculoskeletal     Abdominal    Substance History      OB/GYN          Other                        Anesthesia Plan    ASA 3     general     intravenous induction   Anesthetic plan, all risks, benefits, and alternatives have been provided, discussed and informed consent has been obtained with: patient.

## 2018-10-29 ENCOUNTER — OFFICE VISIT (OUTPATIENT)
Dept: GASTROENTEROLOGY | Facility: CLINIC | Age: 73
End: 2018-10-29

## 2018-10-29 VITALS
HEART RATE: 62 BPM | TEMPERATURE: 96.9 F | SYSTOLIC BLOOD PRESSURE: 132 MMHG | BODY MASS INDEX: 27.97 KG/M2 | WEIGHT: 152 LBS | OXYGEN SATURATION: 99 % | HEIGHT: 62 IN | DIASTOLIC BLOOD PRESSURE: 64 MMHG

## 2018-10-29 DIAGNOSIS — R13.19 OTHER DYSPHAGIA: ICD-10-CM

## 2018-10-29 DIAGNOSIS — K21.9 GASTROESOPHAGEAL REFLUX DISEASE, ESOPHAGITIS PRESENCE NOT SPECIFIED: Primary | ICD-10-CM

## 2018-10-29 DIAGNOSIS — R12 HEARTBURN: ICD-10-CM

## 2018-10-29 PROCEDURE — 99213 OFFICE O/P EST LOW 20 MIN: CPT | Performed by: NURSE PRACTITIONER

## 2018-10-29 RX ORDER — PANTOPRAZOLE SODIUM 40 MG/1
40 TABLET, DELAYED RELEASE ORAL DAILY
Qty: 30 TABLET | Refills: 12 | Status: SHIPPED | OUTPATIENT
Start: 2018-10-29 | End: 2021-06-02

## 2018-10-29 NOTE — PROGRESS NOTES
Chief Complaint:   Chief Complaint   Patient presents with   • Follow-up     6 week follow up from endo.         Patient ID: Arlene England is a 73 y.o. female     History of Present Illness: This is a very pleasant 73-year-old female who is here today to follow-up for recent esophagitis.      The patient underwent an EGD on 9/17/18 with findings of LA grade B esophagitis, medium size hernia and nonobstructing Schatzki's ring that was dilated.  The patient was continued on PPI.  The patient tells me today that she is doing much much better.  She states she has no heartburn or difficulty swallowing since treatment.    The patient denies any nausea, vomiting, epigastric pain, dysphagia, pyrosis or hematemesis.  The patient denies any fever or chills.  Denies any melena or hematochezia.  Denies any unintentional weight loss or loss of appetite.    Past Medical History:   Diagnosis Date   • Dysphagia    • Hyperlipidemia    • Hypertension        Past Surgical History:   Procedure Laterality Date   • CHOLECYSTECTOMY     • COLONOSCOPY N/A 8/14/2017    Procedure: COLONOSCOPY WITH ANESTHESIA;  Surgeon: Lindsay Blair MD;  Location: [facility] ENDOSCOPY;  Service:    • ENDOSCOPY N/A 8/14/2017    Procedure: ESOPHAGOGASTRODUODENOSCOPY WITH ANESTHESIA;  Surgeon: Lindsay Blair MD;  Location: [facility] ENDOSCOPY;  Service:    • ENDOSCOPY N/A 9/17/2018    Procedure: ESOPHAGOGASTRODUODENOSCOPY WITH ANESTHESIA;  Surgeon: Lindsay Blair MD;  Location: [facility] ENDOSCOPY;  Service: Gastroenterology   • HYSTERECTOMY     • TOTAL KNEE ARTHROPLASTY     • UPPER GASTROINTESTINAL ENDOSCOPY  03/14/2016         Current Outpatient Prescriptions:   •  aspirin 325 MG tablet, Take 325 mg by mouth Daily., Disp: , Rfl:   •  lisinopril (PRINIVIL,ZESTRIL) 10 MG tablet, Take 20 mg by mouth., Disp: , Rfl:   •  LORazepam (ATIVAN) 1 MG tablet, Take 1 mg by mouth Every 8 (Eight) Hours As Needed for Anxiety., Disp: , Rfl:   •  pantoprazole (PROTONIX) 40 MG EC tablet,  "Take 1 tablet by mouth Daily., Disp: 30 tablet, Rfl: 12  •  rosuvastatin (CRESTOR) 20 MG tablet, Take 20 mg by mouth., Disp: , Rfl:     Allergies   Allergen Reactions   • Sulfa Antibiotics Rash       Social History     Social History   • Marital status:      Spouse name: N/A   • Number of children: N/A   • Years of education: N/A     Occupational History   • Not on file.     Social History Main Topics   • Smoking status: Never Smoker   • Smokeless tobacco: Never Used   • Alcohol use Yes      Comment: occasional wine   • Drug use: Unknown   • Sexual activity: Not on file     Other Topics Concern   • Not on file     Social History Narrative   • No narrative on file       Family History   Problem Relation Age of Onset   • Adopted: Yes       Vitals:    10/29/18 0923   BP: 132/64   Pulse: 62   Temp: 96.9 °F (36.1 °C)   SpO2: 99%   Weight: 68.9 kg (152 lb)   Height: 157.5 cm (62\")       Review of Systems:    General:    Present -feeling well   Skin:    Not Present-Rash   HEENT:     Not Present-Acute visual changes or Acute hearing changes   Neck :    Not Present- swollen glands   Genitourinary:      Not Present- burning, frequency, urgency hematuria, dysuria,   Cardiovascular:   Not Present-chest pain, palpitations, or pressure   Respiratory:   Not Present- shortness of breath or cough   Gastrointestinal:  Musculoskeletal:  Neurological:  Psychiatric:   Present as mentioned in the HP    Not Present. Recent gait disturbances.    Not Present-Seizures and weakness in extremities.    Not Present- Anxiety or Depression.       Physical Exam:    General Appearance:    Alert, cooperative, in no acute distress   Psych:    Mood appropriate    Eyes:          conjunctivae and sclerae normal, no   icterus, no pallor   ENMT:    Ears appear intact with no abnormalities noted oral mucosa moist   Neck:   No adenopathy, supple, trachea midline, no thyromegaly, no   carotid bruit, no JVD    Cardiovascular:    Regular rhythm and " normal rate, normal S1 and S2, no            murmur, no gallop, no rub, no click   Gastrointestinal:     Inspection normal.  Normal bowel sounds, no masses, no organomegaly, soft round non-tender, non-distended, no guarding, no rebound or tenderness. No hepatosplenomegaly.   Skin:   No bleeding, bruising or rash   Neurologic:   nonfocal       No results found for: WBC, HGB, HCT, PLT     No results found for: NA, K, CL, CO2, BUN, CREATININE, BILITOT, ALKPHOS, ALT, AST, GLUCOSE    No results found for: INR    Body mass index is 27.8 kg/m². Patient's Body mass index is 27.8 kg/m². BMI is below normal parameters. Recommendations include: no follow-up required.    Assessment and Plan:  Assessment/Plan   Arlene was seen today for follow-up.    Diagnoses and all orders for this visit:    Gastroesophageal reflux disease, esophagitis presence not specified  Comments:  With LA grade B esophagitis on EGD 9/17/18.  Continue PPI follow-up in 1 year Rx refilled    Other dysphagia    Heartburn    Other orders  -     pantoprazole (PROTONIX) 40 MG EC tablet; Take 1 tablet by mouth Daily.           There are no Patient Instructions on file for this visit.    Next follow-up appointment          EMR Dragon/Transcription disclaimer:  Much of this encounter note is an electronic transcription/translation of spoken language to printed text. The electronic translation of spoken language may permit erroneous, or at times, nonsensical words or phrases to be inadvertently transcribed; although I have reviewed the note for such errors, some may still exist.

## 2018-11-01 ENCOUNTER — OFFICE VISIT (OUTPATIENT)
Dept: NEUROSURGERY | Age: 73
End: 2018-11-01
Payer: MEDICARE

## 2018-11-01 VITALS
BODY MASS INDEX: 28.16 KG/M2 | HEART RATE: 63 BPM | HEIGHT: 62 IN | DIASTOLIC BLOOD PRESSURE: 72 MMHG | WEIGHT: 153 LBS | SYSTOLIC BLOOD PRESSURE: 144 MMHG

## 2018-11-01 DIAGNOSIS — Z86.73 HISTORY OF CVA (CEREBROVASCULAR ACCIDENT): Primary | ICD-10-CM

## 2018-11-01 DIAGNOSIS — R41.3 MEMORY LOSS: ICD-10-CM

## 2018-11-01 PROCEDURE — 99213 OFFICE O/P EST LOW 20 MIN: CPT | Performed by: NURSE PRACTITIONER

## 2018-11-01 RX ORDER — LORAZEPAM 1 MG/1
1 TABLET ORAL EVERY 8 HOURS PRN
COMMUNITY

## 2018-11-01 RX ORDER — MEMANTINE HYDROCHLORIDE 5 MG/1
5 TABLET ORAL 2 TIMES DAILY
Qty: 60 TABLET | Refills: 2 | Status: SHIPPED | OUTPATIENT
Start: 2018-11-01 | End: 2019-01-07 | Stop reason: SDUPTHER

## 2018-11-01 RX ORDER — PANTOPRAZOLE SODIUM 40 MG/1
1 TABLET, DELAYED RELEASE ORAL DAILY
Refills: 12 | COMMUNITY
Start: 2018-10-29 | End: 2019-08-05 | Stop reason: SDUPTHER

## 2018-11-01 NOTE — PROGRESS NOTES
REVIEW OF SYSTEMS    Constitutional: []Fever []Sweat []Chills [] Recent Injury [x] Denies all unless marked  HEENT:[]Headache  [] Head Injury/Hearing Loss  [] Sore Throat  [] Ear Ache/Dizziness  [x] Denies all unless marked  Spine:  [] Neck pain  [] Back pain  [] Sciaticia  [x] Denies all unless marked  Cardiovascular:[]Heart Disease []Chest Pain [] Palpitations  [x] Denies all unless marked  Pulmonary: []Shortness of Breath []Cough   [x] Denies all unless marke  Gastrointestinal: []Nausea  []Vomiting  []Abdominal Pain  []Constipation  []Diarrhea  []Dark Bloody Stools  [x] Denies all unless marked  Psychiatric/Behavioral:[] Depression [] Anxiety [x] Denies all unless marked  Genitourinary:   [] Frequency  [] Urgency  [] Incontinence [] Pain with Urination  [x] Denies all unless marked  Extremities: []Pain  []Swelling  [x] Denies all unless marked  Musculoskeletal: [] Muscle Pain  [] Joint Pain  [] Arthritis [] Muscle Cramps [] Muscle Twitches  [x] Denies all unless marked  Sleep: [] Insomnia [] Snoring [] Restless Legs [] Sleep Apnea  [] Daytime Sleepiness  [x] Denies all unless marked  Skin:[] Rash [] Skin Discoloration [x] Denies all unless marked   Neurological: [x]Visual Disturbance/Memory Loss [] Loss of Balance [] Slurred Speech/Weakness [] Seizures  [] Vertigo/Dizziness [x] Denies all unless marked

## 2018-11-01 NOTE — PROGRESS NOTES
bilaterally  [x]JORDY normal bilaterally. COMMENTS:   Reflexes  [x]Symmetric and non-pathological  [x]Toes down going bilaterally  [x]No clonus present  COMMENTS:   Gait                  [x]Normal steady gait    []Ataxic    []Spastic     []Magnetic     []Shuffling  COMMENTS:       LABS RECORD AND IMAGING REVIEW (As below and per HPI)    No results found for: [id number]  No results found for: WBC, HGB, HCT, MCV, PLT  No results found for: NA, K, CL, CO2, BUN, CREATININE, GLUCOSE, CALCIUM, PROT, LABALBU, BILITOT, ALKPHOS, AST, ALT, LABGLOM, GFRAA, AGRATIO, GLOB  No results found for: CHOL, TRIG, HDL, LDLCALC  No results found for: TSH, T4FREE  No results found for: CRP, SEDRATE     PCP records reviewed     Reviewed hospital records     Echocardiogram (10/2016)- normal LV size and function. CTA head and neck (10/2016)- no aneurysm or stenosis noted     MRI brain (10/2016)- findings most consistent with a late acute/subacute thalamic lacunar infarct measuring 6mm in size on the left     ASSESSMENT:    Radha Stapleton is a 68y.o. year old female here for follow up. She has a history of stroke in 2016, denies further stroke like symptoms. Will plan to decrease ASA to 81mg today after records review. She has had memory loss since stroke but it has become more bothersome as of recent. Will plan to start low dose Namenda today. Continue stroke risk factor maximization as well. ICD-10-CM    1. History of CVA (cerebrovascular accident) Z86.73    2. Memory loss R41.3      PLAN:  1. Namenda 5mg BID. Discussed titration and side effects. 2. Decrease ASA to 81mg daily. Continue with stroke risk factor maximization with ASA, anti-hypertensive and lipid management   3. Return for follow up, sooner if worsening.  Keep previously scheduled appointment in January     BARRY Garrido    Note:  A total of >50% (>8 minutes) of 15 minutes was spent discussing the pathophysiology and treatment and/or coordination of care of the above diagnoses.

## 2019-01-07 ENCOUNTER — OFFICE VISIT (OUTPATIENT)
Dept: NEUROSURGERY | Age: 74
End: 2019-01-07
Payer: MEDICARE

## 2019-01-07 VITALS
WEIGHT: 158 LBS | HEIGHT: 62 IN | SYSTOLIC BLOOD PRESSURE: 180 MMHG | BODY MASS INDEX: 29.08 KG/M2 | DIASTOLIC BLOOD PRESSURE: 78 MMHG | HEART RATE: 69 BPM

## 2019-01-07 DIAGNOSIS — Z86.73 HISTORY OF CVA (CEREBROVASCULAR ACCIDENT): Primary | ICD-10-CM

## 2019-01-07 DIAGNOSIS — R41.3 MEMORY LOSS: ICD-10-CM

## 2019-01-07 PROCEDURE — 99213 OFFICE O/P EST LOW 20 MIN: CPT | Performed by: NURSE PRACTITIONER

## 2019-01-07 RX ORDER — MEMANTINE HYDROCHLORIDE 5 MG/1
5 TABLET ORAL 2 TIMES DAILY
Qty: 60 TABLET | Refills: 5 | Status: SHIPPED | OUTPATIENT
Start: 2019-01-07 | End: 2019-04-02 | Stop reason: DRUGHIGH

## 2019-03-27 ENCOUNTER — TELEPHONE (OUTPATIENT)
Dept: NEUROSURGERY | Age: 74
End: 2019-03-27

## 2019-04-02 ENCOUNTER — OFFICE VISIT (OUTPATIENT)
Dept: NEUROLOGY | Age: 74
End: 2019-04-02
Payer: MEDICARE

## 2019-04-02 VITALS
RESPIRATION RATE: 18 BRPM | SYSTOLIC BLOOD PRESSURE: 148 MMHG | DIASTOLIC BLOOD PRESSURE: 80 MMHG | OXYGEN SATURATION: 98 % | HEART RATE: 67 BPM

## 2019-04-02 DIAGNOSIS — R41.3 MEMORY LOSS: Primary | ICD-10-CM

## 2019-04-02 DIAGNOSIS — Z86.73 HISTORY OF CVA (CEREBROVASCULAR ACCIDENT): ICD-10-CM

## 2019-04-02 PROCEDURE — 99213 OFFICE O/P EST LOW 20 MIN: CPT | Performed by: PHYSICIAN ASSISTANT

## 2019-04-02 RX ORDER — MEMANTINE HYDROCHLORIDE 10 MG/1
10 TABLET ORAL 2 TIMES DAILY
Qty: 60 TABLET | Refills: 3 | Status: SHIPPED | OUTPATIENT
Start: 2019-04-02 | End: 2019-06-24 | Stop reason: SDUPTHER

## 2019-04-02 NOTE — PATIENT INSTRUCTIONS
Suggestion:  Start taking memantine 5 mg in the morning and 10 mg at bedtime for 1 week, then 10 mg in the morning and 10 mg at bedtime. Ok to take Lexapro at bedtime      Patient education: Lowering the risk of having another stroke      Written by the doctors and editors at [facility]     How can I keep from having another stroke? -- If you had an ischemic stroke - a stroke caused by a blocked artery in the brain - medicines and lifestyle changes can help to lower the chances of having another stroke. If you had a transient ischemic attack or \"TIA,\" these same things can help prevent a full-blown stroke. Medicines and lifestyle changes work together to give the most benefit. It's very important that you take all the medicines your doctor prescribes. It's just as important to make the lifestyle changes your doctor recommends. Medicines -- If you had a stroke or TIA, your doctor or nurse will prescribe medicines to lower your risk of having another stroke. Some of these medicines work by [employer] your risk factors. \" That means that they help lower blood pressure, blood sugar and cholesterol. Other medicines help by keeping blood clots from forming. (Blood clots cause many strokes.)  Medicines that are especially important in preventing strokes include:  ?Medicines to lower blood pressure  ? Medicines called statins, which lower cholesterol  ? Medicines to prevent blood clots, such aspirin or blood thinners  ? Medicines that help to keep your blood sugar as close to normal as possible (if you have diabetes)  Whatever medicines your doctor prescribes, make sure you take them every day as directed. If you cannot afford your medicines or if they cause side effects, talk to your doctor or nurse. There are often ways to deal with these problems. Lifestyle changes -- Lifestyle changes can do a lot to lower your risk of stroke.  That's partly because the right lifestyle choices can help control risk factors such as blood pressure, blood sugar, and cholesterol. Besides, the lifestyle changes that help lower your risk of stroke can also help prevent lots of other health problems. Here are the most important lifestyle changes:  ? Stop smoking, if you smoke  ? Get regular exercise (if your doctor says it's safe) for at least 30 minutes a day on most days of the week  ? Lose weight, if you are overweight  ? Eat a \"Mediterranean\" diet rich in fruits, vegetables, and low-fat dairy products, and low in meats, sweets, and refined grains (such as white bread or white rice)  ? Eat less salt (sodium)  ? Limit the amount of alcohol you drink  If you are a woman, do not drink more than 1 drink a day  If you are a man, do not drink more than 1 to 2 drinks a day  Help with quitting smoking -- If you smoke, ask your doctor or nurse about how to quit. There are strategies and medicines that can improve your chances of success. Studies show that people are most successful at quitting if they take medicines to help them quit and work with a counselor. You might also have a better chance at success if you combine nicotine replacement with one of the prescription medicines that help people quit. You can also get help from a free phone line (8-279-QUIT-NOW) or online at www.smokefree.gov. Keep Your Memory Tyrell Reynolds         Many factors can affect your ability to remembera hectic lifestyle, aging, stress, chronic disease, and certain medicines. But, there are steps you can take to sharpen your mind and help preserve your memory. Challenge Your Brain   Regularly challenging your mind may help keeps it in top shape. Good mental exercises include:   · Crossword puzzles. Use a dictionary if you need it; you will learn more that way. · Brainteasers: Try some! · Crafts, such as wood working and sewing   · [employer], such as gardening and building model airplanes   · Socializing: Visit old friends or join groups to meet new ones.    · Reading   · Learning a new language   · Taking a class, whether it be art history or yefri chi   · Traveling: Experience the food, history, and culture of your destination   · Learning to use a computer   · Going to museums, the theater, or thought-provoking movies   · Changing things in your daily life, such as reversing your pattern in the grocery store or brushing your teeth using your nondominant hand   Use Memory Aids   There is no need to remember every detail on your own. These memory aids can help:   · Calendars and day planners   · Electronic organizers to store all sorts of helpful information. These devices can \"beep\" to remind you of appointments. · A book of days to record birthdays, anniversaries, and other occasions that occur on the same date every year   · Detailed \"to-do\" lists and strategically placed sticky notes   · Quick \"study\" sessions before a gathering, review who will be there so their names will be fresh in your mind. · Establish routines. For example, keep your keys, wallet, and umbrella in the same place all the time or take medicine with your 8:00 AM glass of juice   Live a Healthy Life   Many actions that will keep your body strong will do the same for your mind. For example:   Talk to Your Doctor About Herbs and Supplements   Malnutrition and vitamin deficiencies can impair your mental function. For example, vitamin B12 deficiency can cause a range of symptoms, including confusion. But, what if your nutritional needs are being met? Can herbs and supplements still offer a benefit? Researchers have investigated a range of natural remedies, such as ginkgo , ginseng , and the supplement phosphatidylserine (PS). So far, though, the evidence is inconsistent as to whether these products can improve memory or thinking. If you are interested in taking herbs and supplements, talk to your doctor first because they may interact with other medicines that you are taking.    Exercise Regularly   Among the many benefits of regular exercise are increased blood flow to the brain and decreased risk of certain diseases that can interfere with memory function. One study found that even moderate exercise has a beneficial effect. Examples of \"moderate\" exercise include:   · Playing 18 holes of golf once a week, without a cart   · Playing tennis twice a week   · Walking one mile per day   Manage Stress   It can be tough to remember what is important when your mind is cluttered. Make time for relaxation. Choose activities that calm you down, and make it routine. Manage Chronic Conditions   Side effects of high blood pressure , diabetes, and heart disease can interfere with mental function. Many of the lifestyle steps discussed here can help manage these conditions. Strive to eat a healthy diet, exercise regularly, get stress under control, and follow your doctor's advice for your condition. Minimize Medications   Talk to your doctor about the medicines that you take. Some may be unnecessary. Also, healthy lifestyle habits may lower the need for certain drugs. Older adults have a particularly high risk of adverse effects from hypnotic drugs, including excessive sedation, cognitive impairment, delirium, night wandering, agitation, postoperative confusion, balance problems, and impaired performance of daily activities. An increased risk of falls with severe consequences, including traumatic brain injury and hip fracture, has been observed in association with both benzodiazepines and nonbenzodiazepines such as zolpidem. Patient education: Evaluating memory and thinking problems    Written by the doctors and editors at [employer]   What does \"evaluating memory and thinking problems\" mean? -- It means checking your memory and thinking.  An \"evaluation\" is another word for a check-up or test.  Your doctor might do an evaluation if you or your family has noticed that you are having problems with your memory or thinking, or doing daily activities. An evaluation can show:  ?If you have memory or thinking problems - It is normal for adults to have slight memory problems as they get older. But some people have memory or thinking problems that are more serious. ? The types of problems you have, and how serious they are  An evaluation might also show what's causing your problems. Different conditions can cause memory and thinking problems. Some people have only 1 evaluation. Other people have repeat evaluations to see if their problems get worse over time. What does an evaluation involve? -- An evaluation involves different parts. You might have all of the parts in 1 day, or it might take a few days to do all the parts. The different parts include:  ?Talking with your doctor or nurse - Your doctor or nurse will talk with you and your family about your symptoms, behavior, and daily activities. He or she will ask about changes in your symptoms and behavior. He or she will also ask about your medical problems, medicines, and drug and alcohol use. ?A physical exam  ?Tests for your memory and thinking - The doctor or nurse will ask some questions to check your memory and thinking. For example, he or she might tell you 3 words and have you remember them for a few minutes. You might also see another person for more detailed testing. ? Blood tests - These tests can check for other conditions that could be causing your symptoms. ? A CT or MRI scan of your brain - These are imaging tests that can create pictures of your brain. Not everyone needs a brain scan. Whether or not you have a brain scan depends on your symptoms and physical exam.  Why is it important to have an evaluation? -- It's important to have an evaluation because some memory and thinking problems can be treated. After treatment, the symptoms will often get better. On the other hand, some memory or thinking problems can't be treated. Knowing this is helpful, too.  For example, if you know that your problems might affect certain daily tasks or activities, then you can get help for those tasks or activities. Plus, if you know that your condition is not likely to improve, you and your family can make plans for the future.

## 2019-04-02 NOTE — PROGRESS NOTES
[id number] [facility] Neurology Follow Up Encounter      Information:   Patient Name: Miriam Hutchinson  :   1945  Age:   68 y.o. MRN:   [id number]  Account #:  [de-identified]  Date:              19    Provider:  Lael Mcardle, PA-C    Chief Complaint   Patient presents with    Follow-up     Pt has questions in regards to Memantine       Subjective:   Miriam Hutchinson is a 68 y.o. female  with a history of CVA and memory loss who comes in for a sooner follow up. At her last office visit with BARRY Rodriguez, 19 she was to continue Namenda 5 mg. Her son called, 3/27/19  regarding some changes he and his dad has noted over the past month. He spoke with BARRY Rodriguez. \"Patient has had some increasing confusion, misplacing things around the house, more forgetful. No clear stroke like symptoms. \" She is currently on Namenda 5mg BID, they attempted to increase Namenda further but patient noted side effects with it. It made her groggy. She was taking Namenda 5 mg (two once per day). She also is taking Lexapro q am.  Discussed adding Aricept. He wanted to wait for an office visit. She reports that she forgets names easily. She denies stroke symptoms. Objective:     Past Medical History:   Diagnosis Date    Anxiety     High cholesterol     HTN (hypertension)     TIA (transient ischemic attack)        Past Surgical History:   Procedure Laterality Date    CATARACT REMOVAL WITH IMPLANT      CHOLECYSTECTOMY      JOINT REPLACEMENT Bilateral     PARTIAL HYSTERECTOMY         Recent Hospitalizations  ·     Significant Injuries  ·     History reviewed. No pertinent family history.     Social History     Socioeconomic History    Marital status:      Spouse name: Not on file    Number of children: Not on file    Years of education: Not on file    Highest education level: Not on file   Occupational History    Not on file   Social Needs    Financial resource strain: Not on file    Food insecurity: Worry: Not on file     Inability: Not on file    Transportation needs:     Medical: Not on file     Non-medical: Not on file   Tobacco Use    Smoking status: Never Smoker    Smokeless tobacco: Never Used   Substance and Sexual Activity    Alcohol use: Yes     Comment: occ    Drug use: No    Sexual activity: Not on file   Lifestyle    Physical activity:     Days per week: Not on file     Minutes per session: Not on file    Stress: Not on file   Relationships    Social connections:     Talks on phone: Not on file     Gets together: Not on file     Attends Holiness service: Not on file     Active member of club or organization: Not on file     Attends meetings of clubs or organizations: Not on file     Relationship status: Not on file    Intimate partner violence:     Fear of current or ex partner: Not on file     Emotionally abused: Not on file     Physically abused: Not on file     Forced sexual activity: Not on file   Other Topics Concern    Not on file   Social History Narrative    Not on file       Medications:  Current Outpatient Medications   Medication Sig Dispense Refill    memantine (NAMENDA) 10 MG tablet Take 1 tablet by mouth 2 times daily 60 tablet 3    aspirin 81 MG tablet Take 1 tablet by mouth daily      rosuvastatin (CRESTOR) 20 MG tablet Take 20 mg by mouth daily      escitalopram (LEXAPRO) 10 MG tablet Take 10 mg by mouth daily      lisinopril (PRINIVIL;ZESTRIL) 10 MG tablet Take 20 mg by mouth daily      LORazepam (ATIVAN) 1 MG tablet Take 1 mg by mouth every 8 hours as needed. Luis Fernandoachchava Arena pantoprazole (PROTONIX) 40 MG tablet Take 1 tablet by mouth daily  12     No current facility-administered medications for this visit. Allergies:   Allergies   Allergen Reactions    Sulfa Antibiotics Rash       REVIEW OF SYSTEMS     Constitutional: []Fever []Sweats []Chills [] Recent Injury   [x] Denies all unless marked  HENT:[]Headache  [] Head Injury  [] Sore Throat  [] Ear Pain  [] cyanosis or edema  Skin - Warm, dry, and intact. No rash, erythema, or pallor  Psychiatric - Mood, affect, and behavior appear normal      Neurologic:  Extraocular movements are intact without nystagmus. Visual fields are full to confrontation. Facial movements are symmetrical and normal.  Speech is precise. Extremity strength is normal in both uppers and lowers. Deep tendon reflexes are intact and symmetrical.  Rapid alternating movements are unimpaired. Finger-to-nose testing is performed well, without dysmetria. Gait is normal.      I reviewed the following studies:      [  ]  :  Clinical laboratory test results    [  ]  :  Radiology reports    [  ]  :  Review and summarization of medical records and/or obtain medical records     [  ]  :  Previous/recent polysomnogram report(s)    [  ]  :  [place] Sleepiness Scale        Assessment:       ICD-10-CM    1. Memory loss R41.3    2. History of CVA (cerebrovascular accident) Z80.78              [X]  :  Stable        [  ]  :  Improved                          [  ]  :  Well controlled                 [  ]  :  Resolving        [  ]  :  Resolved        [  ]  :  Inadequately controlled        [  ]  :  Worsening        [  ]  :  Additional workup planned      Plan:   No orders of the defined types were placed in this encounter. Orders Placed This Encounter   Medications    memantine (NAMENDA) 10 MG tablet     Sig: Take 1 tablet by mouth 2 times daily     Dispense:  60 tablet     Refill:  3         1. The following educational material has been included in this visit after visit summary for your review: stroke/secondary prevention/memory loss-  Discussed with the patient and all questions fully answered. 2.  We had a discussion about the clinical issues and went over the various important aspects to consider. Treatment plan discussed. Discussed use, benefit, and SE of prescribed medication. All questions were answered.  Pt voiced understanding and agrees with

## 2019-04-15 ENCOUNTER — OFFICE VISIT (OUTPATIENT)
Dept: GASTROENTEROLOGY | Facility: CLINIC | Age: 74
End: 2019-04-15

## 2019-04-15 VITALS
BODY MASS INDEX: 28.71 KG/M2 | WEIGHT: 156 LBS | DIASTOLIC BLOOD PRESSURE: 74 MMHG | HEIGHT: 62 IN | SYSTOLIC BLOOD PRESSURE: 128 MMHG | OXYGEN SATURATION: 99 % | HEART RATE: 77 BPM | TEMPERATURE: 97.4 F

## 2019-04-15 DIAGNOSIS — K21.9 GASTROESOPHAGEAL REFLUX DISEASE, ESOPHAGITIS PRESENCE NOT SPECIFIED: ICD-10-CM

## 2019-04-15 DIAGNOSIS — K22.2 SCHATZKI'S RING: ICD-10-CM

## 2019-04-15 DIAGNOSIS — R13.19 OTHER DYSPHAGIA: Primary | ICD-10-CM

## 2019-04-15 PROCEDURE — 99214 OFFICE O/P EST MOD 30 MIN: CPT | Performed by: NURSE PRACTITIONER

## 2019-04-15 NOTE — PROGRESS NOTES
"Chief Complaint:   Chief Complaint   Patient presents with   • Difficulty Swallowing     Pt feels like food is getting hung in her esophagus; States it has been going on for about 6 months and is starting to get \"aggravating\"          Patient ID: Arlene England is a 73 y.o. female     History of Present Illness: This is a very pleasant 73-year-old female who is here today with complaints of difficulty swallowing.    She states for about the last month she has begun to have difficulty swallowing meat again most especially when eating out.  She states this does not occur on a daily basis but when it does occur it is very frightening.  She states she begins to have a lot of salavia and then has to go to the bathroom and vomit to get the food up.  She states that she feels her GERD is fairly well controlled on her Protonix.  She denies any chronic NSAID use.    The patient denies any nausea, vomiting, epigastric pain, pyrosis or hematemesis.  The patient denies any fever or chills.  Denies any melena or hematochezia.  Denies any unintentional weight loss or loss of appetite.      Patient's last EGD was performed on 9/17/18 for dysphagia.  Findings of LA grade B esophagitis with no bleeding at the GE junction.  A low-grade of narrowing Schatzki's was found in the GE junction.  Dilated.  Otherwise normal.  Patient was to continue on Protonix 40 mg p.o. daily indefinitely.    Past Medical History:   Diagnosis Date   • Dysphagia    • Hyperlipidemia    • Hypertension        Past Surgical History:   Procedure Laterality Date   • CHOLECYSTECTOMY     • COLONOSCOPY N/A 8/14/2017    Diverticulosis in the sigmoid colon and in the descending colon; Examination was otherwise normal on direct and retroflexion views; No specimens collected; Repeat 5-10 years   • ENDOSCOPY N/A 8/14/2017    Small HH; Normal esophagus; Moderate Schatzki ring-dilated; Normal stomach; Normal duodenum; No specimens collected   • ENDOSCOPY N/A 9/17/2018    " "Medium-sized HH; LA grade B esophagitis; Low grade of narrowing Schatzki ring-dilated; Normal stomach; Normal duodenum; No specimens collected    • ENDOSCOPY  03/14/2016    Small HH; Schatzki ring-dilated; Normal stomach; Normal examined duodenum; No specimens collected    • HYSTERECTOMY     • TOTAL KNEE ARTHROPLASTY           Current Outpatient Medications:   •  aspirin 325 MG tablet, Take 325 mg by mouth Daily., Disp: , Rfl:   •  lisinopril (PRINIVIL,ZESTRIL) 10 MG tablet, Take 20 mg by mouth., Disp: , Rfl:   •  LORazepam (ATIVAN) 1 MG tablet, Take 1 mg by mouth Every 8 (Eight) Hours As Needed for Anxiety., Disp: , Rfl:   •  pantoprazole (PROTONIX) 40 MG EC tablet, Take 1 tablet by mouth Daily., Disp: 30 tablet, Rfl: 12  •  rosuvastatin (CRESTOR) 20 MG tablet, Take 20 mg by mouth., Disp: , Rfl:     Allergies   Allergen Reactions   • Sulfa Antibiotics Rash       Social History     Socioeconomic History   • Marital status:      Spouse name: Not on file   • Number of children: Not on file   • Years of education: Not on file   • Highest education level: Not on file   Tobacco Use   • Smoking status: Never Smoker   • Smokeless tobacco: Never Used   Substance and Sexual Activity   • Alcohol use: Yes     Comment: occasional wine       Family History   Adopted: Yes       Vitals:    04/15/19 1303   BP: 128/74   BP Location: Left arm   Patient Position: Sitting   Cuff Size: Adult   Pulse: 77   Temp: 97.4 °F (36.3 °C)   TempSrc: Tympanic   SpO2: 99%   Weight: 70.8 kg (156 lb)   Height: 157.5 cm (62\")       Review of Systems:    General:    Present -feeling well   Skin:    Not Present-Rash   HEENT:     Not Present-Acute visual changes or Acute hearing changes   Neck :    Not Present- swollen glands   Genitourinary:      Not Present- burning, frequency, urgency hematuria, dysuria,   Cardiovascular:   Not Present-chest pain, palpitations, or pressure   Respiratory:   Not Present- shortness of breath or cough "   Gastrointestinal:  Musculoskeletal:  Neurological:  Psychiatric:   Present as mentioned in the HP    Not Present. Recent gait disturbances.    Not Present-Seizures and weakness in extremities.    Not Present- Anxiety or Depression.       Physical Exam:    General Appearance:    Alert, cooperative, in no acute distress   Psych:    Mood appropriate    Eyes:          conjunctivae and sclerae normal, no   icterus, no pallor   ENMT:    Ears appear intact with no abnormalities noted oral mucosa moist   Neck:   No adenopathy, supple, trachea midline, no thyromegaly, no   carotid bruit, no JVD    Cardiovascular:    Regular rhythm and normal rate, normal S1 and S2, no            murmur, no gallop, no rub, no click   Gastrointestinal:     Inspection normal.  Normal bowel sounds, no masses, no organomegaly, soft round non-tender, non-distended, no guarding, no rebound or tenderness. No hepatosplenomegaly.   Skin:   No bleeding, bruising or rash   Neurologic:   nonfocal       No results found for: WBC, HGB, HCT, PLT     No results found for: NA, K, CL, CO2, BUN, CREATININE, BILITOT, ALKPHOS, ALT, AST, GLUCOSE    No results found for: INR    Body mass index is 28.53 kg/m². Patient's Body mass index is 28.53 kg/m². BMI is above normal parameters. Recommendations include: nutrition counseling.    Assessment and Plan:  Assessment/Plan   Arlene was seen today for difficulty swallowing.    Diagnoses and all orders for this visit:    Other dysphagia  Comments:  Schedule EGD if found to be normal may need dysphagia swallow study  Orders:  -     Case Request; Standing  -     Case Request    Gastroesophageal reflux disease, esophagitis presence not specified  Comments:  Continue PPI.  Patient states well-controlled on Protonix.  Orders:  -     Case Request; Standing  -     Case Request    Aura espinal  Comments:  history of 2018  Orders:  -     Case Request; Standing  -     Case Request    Other orders  -     Follow Anesthesia  Guidelines / Standing Orders; Future  -     Implement Anesthesia Orders Day of Procedure; Standing  -     Obtain Informed Consent; Standing      The risks, benefits, and alternatives of endoscopy were reviewed with the patient today.  Risks including perforation, with or without dilation, possibly requiring surgery.  Additional risks include risk of bleeding.  There is also the risk of a drug reaction or problems with anesthesia.  This will be discussed with the further by the anesthesia team on the day of the procedure. The benefits include the diagnosis and management of disease of the upper digestive tract.  Alternatives to endoscopy include upper GI series, laboratory testing, radiographic evaluation, or no intervention.  The patient verbalizes understanding and agrees.         There are no Patient Instructions on file for this visit.    Next follow-up appointment        EMR Dragon/Transcription disclaimer:  Much of this encounter note is an electronic transcription/translation of spoken language to printed text. The electronic translation of spoken language may permit erroneous, or at times, nonsensical words or phrases to be inadvertently transcribed; although I have reviewed the note for such errors, some may still exist.

## 2019-05-13 ENCOUNTER — HOSPITAL ENCOUNTER (OUTPATIENT)
Facility: HOSPITAL | Age: 74
Setting detail: HOSPITAL OUTPATIENT SURGERY
Discharge: HOME | End: 2019-05-13
Attending: INTERNAL MEDICINE | Admitting: INTERNAL MEDICINE

## 2019-05-13 ENCOUNTER — ANESTHESIA EVENT (OUTPATIENT)
Dept: GASTROENTEROLOGY | Facility: HOSPITAL | Age: 74
End: 2019-05-13

## 2019-05-13 ENCOUNTER — ANESTHESIA (OUTPATIENT)
Dept: GASTROENTEROLOGY | Facility: HOSPITAL | Age: 74
End: 2019-05-13

## 2019-05-13 VITALS
TEMPERATURE: 97.2 F | SYSTOLIC BLOOD PRESSURE: 108 MMHG | BODY MASS INDEX: 47.29 KG/M2 | HEART RATE: 60 BPM | RESPIRATION RATE: 18 BRPM | WEIGHT: 257 LBS | OXYGEN SATURATION: 98 % | DIASTOLIC BLOOD PRESSURE: 59 MMHG | HEIGHT: 62 IN

## 2019-05-13 PROCEDURE — 43249 ESOPH EGD DILATION <30 MM: CPT | Performed by: INTERNAL MEDICINE

## 2019-05-13 PROCEDURE — C1726 CATH, BAL DIL, NON-VASCULAR: HCPCS | Performed by: INTERNAL MEDICINE

## 2019-05-13 PROCEDURE — 25010000002 PROPOFOL 10 MG/ML EMULSION: Performed by: NURSE ANESTHETIST, CERTIFIED REGISTERED

## 2019-05-13 RX ORDER — LIDOCAINE HYDROCHLORIDE 20 MG/ML
INJECTION, SOLUTION INFILTRATION; PERINEURAL AS NEEDED
Status: DISCONTINUED | OUTPATIENT
Start: 2019-05-13 | End: 2019-05-13 | Stop reason: SURG

## 2019-05-13 RX ORDER — SODIUM CHLORIDE 0.9 % (FLUSH) 0.9 %
3 SYRINGE (ML) INJECTION AS NEEDED
Status: DISCONTINUED | OUTPATIENT
Start: 2019-05-13 | End: 2019-05-13 | Stop reason: HOSPADM

## 2019-05-13 RX ORDER — SODIUM CHLORIDE 9 MG/ML
500 INJECTION, SOLUTION INTRAVENOUS CONTINUOUS PRN
Status: DISCONTINUED | OUTPATIENT
Start: 2019-05-13 | End: 2019-05-13 | Stop reason: HOSPADM

## 2019-05-13 RX ORDER — PROPOFOL 10 MG/ML
VIAL (ML) INTRAVENOUS AS NEEDED
Status: DISCONTINUED | OUTPATIENT
Start: 2019-05-13 | End: 2019-05-13 | Stop reason: SURG

## 2019-05-13 RX ADMIN — PROPOFOL 100 MG: 10 INJECTION, EMULSION INTRAVENOUS at 09:33

## 2019-05-13 RX ADMIN — PROPOFOL 100 MG: 10 INJECTION, EMULSION INTRAVENOUS at 09:28

## 2019-05-13 RX ADMIN — SODIUM CHLORIDE 500 ML: 9 INJECTION, SOLUTION INTRAVENOUS at 08:38

## 2019-05-13 RX ADMIN — LIDOCAINE HYDROCHLORIDE 100 MG: 20 INJECTION, SOLUTION INFILTRATION; PERINEURAL at 09:28

## 2019-05-13 RX ADMIN — PROPOFOL 50 MG: 10 INJECTION, EMULSION INTRAVENOUS at 09:37

## 2019-05-13 NOTE — ANESTHESIA PREPROCEDURE EVALUATION
Anesthesia Evaluation     Patient summary reviewed and Nursing notes reviewed   no history of anesthetic complications:  NPO Solid Status: > 8 hours  NPO Liquid Status: > 8 hours           Airway   Mallampati: II  TM distance: >3 FB  Neck ROM: full  Dental      Pulmonary - negative pulmonary ROS   Cardiovascular   Exercise tolerance: excellent (>7 METS)    (+) hypertension, hyperlipidemia,       Neuro/Psych  (+) TIA,     GI/Hepatic/Renal/Endo    (+)  GERD,      Musculoskeletal     Abdominal    Substance History      OB/GYN          Other                          Anesthesia Plan    ASA 3     MAC     intravenous induction   Anesthetic plan, all risks, benefits, and alternatives have been provided, discussed and informed consent has been obtained with: patient.

## 2019-05-13 NOTE — ANESTHESIA POSTPROCEDURE EVALUATION
Patient: Arlene England    Procedure Summary     Date:  05/13/19 Room / Location:  [facility] ENDOSCOPY 6 / [facility]    Anesthesia Start:  0926 Anesthesia Stop:  0938    Procedure:  ESOPHAGOGASTRODUODENOSCOPY WITH ANESTHESIA (N/A ) Diagnosis:       Other dysphagia      Gastroesophageal reflux disease, esophagitis presence not specified      Schatzki's ring      (Other dysphagia [R13.19])      (Gastroesophageal reflux disease, esophagitis presence not specified [K21.9])      (Schatzki's ring [K22.2])    Surgeon:  Lindsay Blair MD Provider:  Abhishek Martínez CRNA    Anesthesia Type:  MAC ASA Status:  3          Anesthesia Type: MAC  Last vitals  BP   165/72 (05/13/19 0822)   Temp   97.2 °F (36.2 °C) (05/13/19 0822)   Pulse   68 (05/13/19 0822)   Resp   20 (05/13/19 0822)     SpO2   100 % (05/13/19 0822)     Post Anesthesia Care and Evaluation    Patient location during evaluation: PHASE II  Patient participation: complete - patient participated  Level of consciousness: awake and alert  Pain management: adequate  Airway patency: patent  Anesthetic complications: No anesthetic complications  PONV Status: none  Cardiovascular status: acceptable and stable  Respiratory status: acceptable and unassisted  Hydration status: acceptable

## 2019-05-13 NOTE — INTERVAL H&P NOTE
H&P updated. The patient was examined and the following changes are noted:         The dysphagia did not improve after the endoscopy in September 2018 with dilation.  She does however feels that it could be improved as it is still intermittently becoming lodged.  It is better than before the previous dilation in September 2018.

## 2019-06-04 ENCOUNTER — TELEPHONE (OUTPATIENT)
Dept: NEUROLOGY | Age: 74
End: 2019-06-04

## 2019-06-04 NOTE — TELEPHONE ENCOUNTER
Patient called and stated that her appointment for 6/6/19 was cancelled and rescheduled for 7/8/19. Patient said that Thais Worrell was going to start her on the medication Aricept at her visit in June to help with her memory. Patient is asking if we can go ahead and send in the Aricept   to her Pharmacy so that way when she sees Katerynachava Gavin on 7/8/19 she will have been on the medication for approx a month. Patient uses [address] in [place], [place].   Please advise

## 2019-06-04 NOTE — TELEPHONE ENCOUNTER
Partha Cameron requests that a nurse return their call about her medications. The best time to reach her is Anytime. Thank you.

## 2019-06-05 NOTE — TELEPHONE ENCOUNTER
They mention borderline bradycardia in the notes previously, will need to see back in clinic before starting, work her in with [id number] [address] the first week she is back.

## 2019-06-07 NOTE — TELEPHONE ENCOUNTER
Called patient per Dr Zayda Macias to schedule patient to be seen sooner with EG when she comes back from maturity leave. Patient is happy to see her in 2 weeks.

## 2019-06-24 ENCOUNTER — OFFICE VISIT (OUTPATIENT)
Dept: NEUROSURGERY | Age: 74
End: 2019-06-24
Payer: MEDICARE

## 2019-06-24 VITALS
HEIGHT: 62 IN | WEIGHT: 160 LBS | BODY MASS INDEX: 29.44 KG/M2 | OXYGEN SATURATION: 99 % | DIASTOLIC BLOOD PRESSURE: 70 MMHG | HEART RATE: 55 BPM | SYSTOLIC BLOOD PRESSURE: 137 MMHG

## 2019-06-24 DIAGNOSIS — Z86.73 HISTORY OF CVA (CEREBROVASCULAR ACCIDENT): ICD-10-CM

## 2019-06-24 DIAGNOSIS — R41.3 MEMORY LOSS: Primary | ICD-10-CM

## 2019-06-24 PROCEDURE — 99213 OFFICE O/P EST LOW 20 MIN: CPT | Performed by: NURSE PRACTITIONER

## 2019-06-24 RX ORDER — MEMANTINE HYDROCHLORIDE 10 MG/1
10 TABLET ORAL 2 TIMES DAILY
Qty: 60 TABLET | Refills: 3 | Status: SHIPPED | OUTPATIENT
Start: 2019-06-24 | End: 2019-08-05 | Stop reason: SDUPTHER

## 2019-06-24 RX ORDER — DONEPEZIL HYDROCHLORIDE 5 MG/1
5 TABLET, FILM COATED ORAL NIGHTLY
Qty: 30 TABLET | Refills: 1 | Status: SHIPPED | OUTPATIENT
Start: 2019-06-24 | End: 2019-08-05 | Stop reason: SDUPTHER

## 2019-06-24 NOTE — PROGRESS NOTES
Review of Systems:  Denies all others  Constitution- None  Skin- None  Hearing/ Nose/ Throat- None  Eyes- None  Cardiovascular- None  Respiratory- None  Gastrointestinal- None  Genitourinary- None  Musculoskeletal- None  Endocrine/ Hematology/ Allergy- None  Neurological- None  Psychiatric- None

## 2019-06-24 NOTE — PROGRESS NOTES
[facility] Neurology Office Note      Patient:   Carlos Mccullough  MR#:    [id number]  Account Number:                         YOB: 1945  Date of Evaluation:  6/24/2019  Time of Note:                          9:27 AM  Primary/Referring Physician:  Yoana Duff   Consulting Physician:  BARRY Mak    FOLLOW UP VISIT     Chief Complaint   Patient presents with    Follow-up    Cerebrovascular Accident    Medication Refill       HISTORY OF PRESENT ILLNESS    Carlos Mccullough is a 68y.o. year old female here for follow up of memory loss and CVA.  has noted worsening short term memory since last visit. Still able to complete ADLs without difficulty. Can handle finances. No difficulty with medication management. Mostly notices difficulty with remembering names, appointment times. Denies urinary incontinence or gait changes. Still driving, denies getting lost, no tickets or accidents. She saw CHERISE Quintero in April and Trbonje was increased. Denies further stroke like symptoms. She had a stroke in October 2016, some issues with short term memory since that time, no other residual. Family history with aunt having dementia. Past Medical History:   Diagnosis Date    Anxiety     High cholesterol     HTN (hypertension)     TIA (transient ischemic attack)        Past Surgical History:   Procedure Laterality Date    CATARACT REMOVAL WITH IMPLANT      CHOLECYSTECTOMY      JOINT REPLACEMENT Bilateral     PARTIAL HYSTERECTOMY         History reviewed. No pertinent family history.     Social History     Socioeconomic History    Marital status:      Spouse name: Not on file    Number of children: Not on file    Years of education: Not on file    Highest education level: Not on file   Occupational History    Not on file   Social Needs    Financial resource strain: Not on file    Food insecurity:     Worry: Not on file     Inability: Not on file    Transportation needs:     Medical: Not on file     Non-medical: Not on file   Tobacco Use    Smoking status: Never Smoker    Smokeless tobacco: Never Used   Substance and Sexual Activity    Alcohol use: Yes     Comment: occ    Drug use: No    Sexual activity: Not on file   Lifestyle    Physical activity:     Days per week: Not on file     Minutes per session: Not on file    Stress: Not on file   Relationships    Social connections:     Talks on phone: Not on file     Gets together: Not on file     Attends [employer]agogue service: Not on file     Active member of club or organization: Not on file     Attends meetings of clubs or organizations: Not on file     Relationship status: Not on file    Intimate partner violence:     Fear of current or ex partner: Not on file     Emotionally abused: Not on file     Physically abused: Not on file     Forced sexual activity: Not on file   Other Topics Concern    Not on file   Social History Narrative    Not on file       Current Outpatient Medications   Medication Sig Dispense Refill    memantine (NAMENDA) 10 MG tablet Take 1 tablet by mouth 2 times daily 60 tablet 3    donepezil (ARICEPT) 5 MG tablet Take 1 tablet by mouth nightly 30 tablet 1    aspirin 81 MG tablet Take 1 tablet by mouth daily      rosuvastatin (CRESTOR) 20 MG tablet Take 20 mg by mouth daily      escitalopram (LEXAPRO) 10 MG tablet Take 10 mg by mouth daily      lisinopril (PRINIVIL;ZESTRIL) 10 MG tablet Take 20 mg by mouth daily      LORazepam (ATIVAN) 1 MG tablet Take 1 mg by mouth every 8 hours as needed. Renu Monas pantoprazole (PROTONIX) 40 MG tablet Take 1 tablet by mouth daily  12     No current facility-administered medications for this visit.         Allergies   Allergen Reactions    Sulfa Antibiotics Rash     REVIEW OF SYSTEMS  Constitutional: []Fever []Sweat []Chills [] Recent Injury [x] Denies all unless marked  HEENT:[]Headache  [] Head Injury/Hearing Loss  [] Sore Throat  [] Ear Ache/Dizziness  [x] Denies all unless marked  Spine:  [] Neck pain  [] Back pain  [] Sciaticia  [x] Denies all unless marked  Cardiovascular:[]Heart Disease []Chest Pain [] Palpitations  [x] Denies all unless marked  Pulmonary: []Shortness of Breath []Cough   [x] Denies all unless marke  Gastrointestinal: []Nausea  []Vomiting  []Abdominal Pain  []Constipation  []Diarrhea  []Dark Bloody Stools  [x] Denies all unless marked  Psychiatric/Behavioral:[] Depression [] Anxiety [x] Denies all unless marked  Genitourinary:   [] Frequency  [] Urgency  [] Incontinence [] Pain with Urination  [x] Denies all unless marked  Extremities: []Pain  []Swelling  [x] Denies all unless marked  Musculoskeletal: [] Muscle Pain  [] Joint Pain  [] Arthritis [] Muscle Cramps [] Muscle Twitches  [x] Denies all unless marked  Sleep: [] Insomnia [] Snoring [] Restless Legs [] Sleep Apnea  [] Daytime Sleepiness  [x] Denies all unless marked  Skin:[] Rash [] Skin Discoloration [x] Denies all unless marked   Neurological: [x]Visual Disturbance/Memory Loss [] Loss of Balance [] Slurred Speech/Weakness [] Seizures  [] Vertigo/Dizziness [x] Denies all unless marked     The MA has completed the ROS with the patient. I have reviewed it in its' entirety with the patient and agree with the documentation. PHYSICAL EXAM  /70   Pulse 55   Ht 5' 2\" (1.575 m)   Wt 160 lb (72.6 kg)   SpO2 99%   BMI 29.26 kg/m²       Constitutional - No acute distress    HEENT- Conjunctiva normal.  No scars, masses, or lesions over external nose or ears, no neck masses noted, no jugular vein distension, no bruit  Pulmonary- Good expansion, normal effort without use of accessory muscles  Musculoskeletal - No significant wasting of muscles noted, no bony deformities  Extremities - No clubbing, cyanosis or edema  Skin - Warm, dry, and intact.   No rash, erythema, or pallor  Psychiatric - Mood, affect, and behavior appear normal      NEUROLOGICAL EXAM     Mental status   [x]Awake, alert, oriented [x]Affect attention and concentration appear appropriate  [x]Recent and remote memory appears unremarkable  [x]Speech normal without dysarthria or aphasia, comprehension and repetition intact. COMMENTS: MoCA 27/30    Cranial Nerves [x]No VF deficit to confrontation,  no papilledema on fundoscopic exam.  [x]PERRLA, EOMI, no nystagmus, conjugate eye movements, no ptosis  [x]Face symmetric  [x]Facial sensation intact  [x]Tongue midline no atrophy or fasciculations present  [x]Palate midline, hearing to finger rub normal bilaterally  [x]Shoulder shrug and SCM testing normal bilaterally  COMMENTS:   Motor   [x]5/5 strength x 4 extremities  [x]Normal bulk and tone  [x]No tremor present  [x]No rigidity or bradykinesia noted  COMMENTS:   Sensory  [x]Sensation intact to light touch, pin prick, vibration, and proprioception BLE  [x]Sensation intact to light touch, pin prick, vibration, and proprioception BUE  COMMENTS:   Coordination [x]FTN normal bilaterally   [x]HTS normal bilaterally  [x]JORDY normal bilaterally. COMMENTS:   Reflexes  [x]Symmetric and non-pathological  [x]Toes down going bilaterally  [x]No clonus present  COMMENTS:   Gait                  [x]Normal steady gait    []Ataxic    []Spastic     []Magnetic     []Shuffling  COMMENTS:       LABS RECORD AND IMAGING REVIEW (As below and per HPI)    No results found for: [id number]  No results found for: WBC, HGB, HCT, MCV, PLT  No results found for: NA, K, CL, CO2, BUN, CREATININE, GLUCOSE, CALCIUM, PROT, LABALBU, BILITOT, ALKPHOS, AST, ALT, LABGLOM, GFRAA, AGRATIO, GLOB  No results found for: CHOL, TRIG, HDL, LDLCALC  No results found for: TSH, T4FREE  No results found for: CRP, SEDRATE     PCP records reviewed     Reviewed hospital records     Echocardiogram (10/2016)- normal LV size and function.      CTA head and neck (10/2016)- no aneurysm or stenosis noted     MRI brain (10/2016)- findings most consistent with a late acute/subacute thalamic lacunar infarct measuring 6mm in size on the left     ASSESSMENT:    Sadi Lopez is a 68y.o. year old female here for follow up of memory loss. Some mild progression noted since last visit. Still able to complete ADLs without difficulty. Namenda increased in April. Patient and  are interested in adding Aricept today. Discussed this and potential of bradycardia. Patient's heart rate is in the 50's today. Educated patient on checking pulse at home and other symptoms of bradycardia. Will add low dose Aricept today while monitoring closely. Patient's granddaughter is a NP as well and can help with monitoring heart rate. No diagnosis found. PLAN:  1. Continue Namenda 10mg BID   2. Add low dose Aricept 5mg nightly. Discussed side effects. 3. Educated on how to check heart rate- will monitor at home with  and call with any heart rate less than 50. Granddaughter is a NP as well and will help with monitoring. 4. Continue stroke risk factor maximization   5. Recommend 30 minutes daily exercise, daily mental stimulation, and healthy diet with fish, fruits, vegetables, fiber and water   6. Return in about 6 weeks (around 8/5/2019) for follow up, sooner if worsening. BARRY Zaldivar    Note:  A total of >50% (>8 minutes) of 15 minutes was spent discussing the pathophysiology and treatment and/or coordination of care of the above diagnoses.

## 2019-08-05 ENCOUNTER — OFFICE VISIT (OUTPATIENT)
Dept: NEUROSURGERY | Age: 74
End: 2019-08-05
Payer: MEDICARE

## 2019-08-05 VITALS
WEIGHT: 163 LBS | BODY MASS INDEX: 30 KG/M2 | OXYGEN SATURATION: 98 % | HEIGHT: 62 IN | DIASTOLIC BLOOD PRESSURE: 92 MMHG | SYSTOLIC BLOOD PRESSURE: 120 MMHG | HEART RATE: 64 BPM

## 2019-08-05 DIAGNOSIS — R41.3 MEMORY LOSS: Primary | ICD-10-CM

## 2019-08-05 DIAGNOSIS — Z86.73 HISTORY OF CVA (CEREBROVASCULAR ACCIDENT): ICD-10-CM

## 2019-08-05 PROCEDURE — 99213 OFFICE O/P EST LOW 20 MIN: CPT | Performed by: NURSE PRACTITIONER

## 2019-08-05 RX ORDER — PANTOPRAZOLE SODIUM 40 MG/1
40 TABLET, DELAYED RELEASE ORAL DAILY
Qty: 30 TABLET | Refills: 0 | Status: SHIPPED | OUTPATIENT
Start: 2019-08-05 | End: 2021-06-28 | Stop reason: SDUPTHER

## 2019-08-05 RX ORDER — DONEPEZIL HYDROCHLORIDE 5 MG/1
5 TABLET, FILM COATED ORAL NIGHTLY
Qty: 30 TABLET | Refills: 5 | Status: SHIPPED | OUTPATIENT
Start: 2019-08-05 | End: 2020-03-03 | Stop reason: SDUPTHER

## 2019-08-05 RX ORDER — MEMANTINE HYDROCHLORIDE 10 MG/1
10 TABLET ORAL 2 TIMES DAILY
Qty: 60 TABLET | Refills: 5 | Status: SHIPPED | OUTPATIENT
Start: 2019-08-05 | End: 2020-03-03 | Stop reason: SDUPTHER

## 2019-08-05 NOTE — PROGRESS NOTES
file   Social Needs    Financial resource strain: Not on file    Food insecurity:     Worry: Not on file     Inability: Not on file    Transportation needs:     Medical: Not on file     Non-medical: Not on file   Tobacco Use    Smoking status: Never Smoker    Smokeless tobacco: Never Used   Substance and Sexual Activity    Alcohol use: Yes     Comment: occ    Drug use: No    Sexual activity: Not on file   Lifestyle    Physical activity:     Days per week: Not on file     Minutes per session: Not on file    Stress: Not on file   Relationships    Social connections:     Talks on phone: Not on file     Gets together: Not on file     Attends [employer] service: Not on file     Active member of club or organization: Not on file     Attends meetings of clubs or organizations: Not on file     Relationship status: Not on file    Intimate partner violence:     Fear of current or ex partner: Not on file     Emotionally abused: Not on file     Physically abused: Not on file     Forced sexual activity: Not on file   Other Topics Concern    Not on file   Social History Narrative    Not on file       Current Outpatient Medications   Medication Sig Dispense Refill    pantoprazole (PROTONIX) 40 MG tablet Take 1 tablet by mouth daily 30 tablet 0    donepezil (ARICEPT) 5 MG tablet Take 1 tablet by mouth nightly 30 tablet 5    memantine (NAMENDA) 10 MG tablet Take 1 tablet by mouth 2 times daily 60 tablet 5    aspirin 81 MG tablet Take 1 tablet by mouth daily      rosuvastatin (CRESTOR) 20 MG tablet Take 20 mg by mouth daily      lisinopril (PRINIVIL;ZESTRIL) 10 MG tablet Take 20 mg by mouth daily      LORazepam (ATIVAN) 1 MG tablet Take 1 mg by mouth every 8 hours as needed. Tresia Radha escitalopram (LEXAPRO) 10 MG tablet Take 10 mg by mouth daily       No current facility-administered medications for this visit.         Allergies   Allergen Reactions    Sulfa Antibiotics Rash     REVIEW OF SYSTEMS  Constitutional:

## 2020-01-07 ENCOUNTER — TELEPHONE (OUTPATIENT)
Dept: NEUROSURGERY | Age: 75
End: 2020-01-07

## 2020-03-03 ENCOUNTER — OFFICE VISIT (OUTPATIENT)
Dept: NEUROSURGERY | Age: 75
End: 2020-03-03
Payer: MEDICARE

## 2020-03-03 VITALS
HEART RATE: 63 BPM | DIASTOLIC BLOOD PRESSURE: 67 MMHG | WEIGHT: 168.2 LBS | SYSTOLIC BLOOD PRESSURE: 134 MMHG | OXYGEN SATURATION: 98 % | BODY MASS INDEX: 30.95 KG/M2 | HEIGHT: 62 IN

## 2020-03-03 PROCEDURE — 3017F COLORECTAL CA SCREEN DOC REV: CPT | Performed by: NURSE PRACTITIONER

## 2020-03-03 PROCEDURE — G8417 CALC BMI ABV UP PARAM F/U: HCPCS | Performed by: NURSE PRACTITIONER

## 2020-03-03 PROCEDURE — 1090F PRES/ABSN URINE INCON ASSESS: CPT | Performed by: NURSE PRACTITIONER

## 2020-03-03 PROCEDURE — G8427 DOCREV CUR MEDS BY ELIG CLIN: HCPCS | Performed by: NURSE PRACTITIONER

## 2020-03-03 PROCEDURE — G8400 PT W/DXA NO RESULTS DOC: HCPCS | Performed by: NURSE PRACTITIONER

## 2020-03-03 PROCEDURE — 4040F PNEUMOC VAC/ADMIN/RCVD: CPT | Performed by: NURSE PRACTITIONER

## 2020-03-03 PROCEDURE — 1036F TOBACCO NON-USER: CPT | Performed by: NURSE PRACTITIONER

## 2020-03-03 PROCEDURE — G8484 FLU IMMUNIZE NO ADMIN: HCPCS | Performed by: NURSE PRACTITIONER

## 2020-03-03 PROCEDURE — 1123F ACP DISCUSS/DSCN MKR DOCD: CPT | Performed by: NURSE PRACTITIONER

## 2020-03-03 PROCEDURE — 99213 OFFICE O/P EST LOW 20 MIN: CPT | Performed by: NURSE PRACTITIONER

## 2020-03-03 RX ORDER — DONEPEZIL HYDROCHLORIDE 5 MG/1
5 TABLET, FILM COATED ORAL NIGHTLY
Qty: 30 TABLET | Refills: 5 | Status: SHIPPED | OUTPATIENT
Start: 2020-03-03 | End: 2020-10-05

## 2020-03-03 RX ORDER — LISINOPRIL 20 MG/1
20 TABLET ORAL DAILY
COMMUNITY
Start: 2020-01-07

## 2020-03-03 RX ORDER — MEMANTINE HYDROCHLORIDE 10 MG/1
10 TABLET ORAL 2 TIMES DAILY
Qty: 60 TABLET | Refills: 5 | Status: SHIPPED | OUTPATIENT
Start: 2020-03-03 | End: 2020-12-07

## 2020-10-05 RX ORDER — DONEPEZIL HYDROCHLORIDE 5 MG/1
5 TABLET, FILM COATED ORAL NIGHTLY
Qty: 30 TABLET | Refills: 0 | Status: SHIPPED | OUTPATIENT
Start: 2020-10-05 | End: 2020-11-09

## 2020-10-05 NOTE — TELEPHONE ENCOUNTER
Requested Prescriptions     Pending Prescriptions Disp Refills    donepezil (ARICEPT) 5 MG tablet [Pharmacy Med Name: Donepezil HCl 5 MG Oral Tablet] 30 tablet 0     Sig: Take 1 tablet by mouth nightly       Last Office Visit: 3/3/2020  Next Office Visit: Visit date not found  Last Medication Refill: 3/3/20 with 5 refills  Jie Santana pt

## 2020-11-09 RX ORDER — DONEPEZIL HYDROCHLORIDE 5 MG/1
5 TABLET, FILM COATED ORAL NIGHTLY
Qty: 30 TABLET | Refills: 0 | Status: SHIPPED | OUTPATIENT
Start: 2020-11-09 | End: 2020-12-07

## 2020-12-07 RX ORDER — DONEPEZIL HYDROCHLORIDE 5 MG/1
5 TABLET, FILM COATED ORAL NIGHTLY
Qty: 30 TABLET | Refills: 0 | Status: SHIPPED | OUTPATIENT
Start: 2020-12-09 | End: 2021-01-11

## 2020-12-07 RX ORDER — MEMANTINE HYDROCHLORIDE 10 MG/1
TABLET ORAL
Qty: 60 TABLET | Refills: 0 | Status: SHIPPED | OUTPATIENT
Start: 2020-12-07 | End: 2021-02-08

## 2020-12-07 NOTE — TELEPHONE ENCOUNTER
Requested Prescriptions     Pending Prescriptions Disp Refills    donepezil (ARICEPT) 5 MG tablet [Pharmacy Med Name: Donepezil HCl 5 MG Oral Tablet] 30 tablet 0     Sig: Take 1 tablet by mouth nightly       Last Office Visit:  3/3/2020  Next Office Visit:  Visit date not found  Last Medication Refill:  11/9/20

## 2021-01-11 RX ORDER — DONEPEZIL HYDROCHLORIDE 5 MG/1
5 TABLET, FILM COATED ORAL NIGHTLY
Qty: 30 TABLET | Refills: 0 | Status: SHIPPED | OUTPATIENT
Start: 2021-01-11 | End: 2021-02-08

## 2021-01-11 NOTE — TELEPHONE ENCOUNTER
Requested Prescriptions     Pending Prescriptions Disp Refills    donepezil (ARICEPT) 5 MG tablet [Pharmacy Med Name: Donepezil HCl 5 MG Oral Tablet] 30 tablet 0     Sig: Take 1 tablet by mouth nightly       Last Office Visit: 3/3/2020  Next Office Visit: Visit date not found  Last Medication Refill: 12/9/20

## 2021-02-08 RX ORDER — MEMANTINE HYDROCHLORIDE 10 MG/1
TABLET ORAL
Qty: 60 TABLET | Refills: 0 | Status: SHIPPED | OUTPATIENT
Start: 2021-02-08 | End: 2021-03-17

## 2021-02-08 RX ORDER — DONEPEZIL HYDROCHLORIDE 5 MG/1
5 TABLET, FILM COATED ORAL NIGHTLY
Qty: 30 TABLET | Refills: 0 | Status: SHIPPED | OUTPATIENT
Start: 2021-02-08 | End: 2021-03-17

## 2021-02-08 NOTE — TELEPHONE ENCOUNTER
Requested Prescriptions     Pending Prescriptions Disp Refills    donepezil (ARICEPT) 5 MG tablet [Pharmacy Med Name: Donepezil HCl 5 MG Oral Tablet] 30 tablet 0     Sig: Take 1 tablet by mouth nightly    memantine (NAMENDA) 10 MG tablet [Pharmacy Med Name: Memantine HCl 10 MG Oral Tablet] 60 tablet 0     Sig: Take 1 tablet by mouth twice daily       Last Office Visit: 3/3/2020  Next Office Visit: Visit date not found  Last Medication Refill: Aricept 1/11/2021, Namenda 12/7/2020

## 2021-03-17 RX ORDER — MEMANTINE HYDROCHLORIDE 10 MG/1
TABLET ORAL
Qty: 60 TABLET | Refills: 0 | Status: SHIPPED | OUTPATIENT
Start: 2021-03-17 | End: 2021-04-16

## 2021-03-17 RX ORDER — DONEPEZIL HYDROCHLORIDE 5 MG/1
5 TABLET, FILM COATED ORAL NIGHTLY
Qty: 30 TABLET | Refills: 0 | Status: SHIPPED | OUTPATIENT
Start: 2021-03-17 | End: 2021-04-08

## 2021-03-17 NOTE — TELEPHONE ENCOUNTER
Requested Prescriptions     Pending Prescriptions Disp Refills    donepezil (ARICEPT) 5 MG tablet [Pharmacy Med Name: Donepezil HCl 5 MG Oral Tablet] 30 tablet 0     Sig: Take 1 tablet by mouth nightly    memantine (NAMENDA) 10 MG tablet [Pharmacy Med Name: Memantine HCl 10 MG Oral Tablet] 60 tablet 0     Sig: Take 1 tablet by mouth twice daily       Last Office Visit: 3/3/20  Next Office Visit: Visit date not found  Last Medication Refill: 2/8/21 for both

## 2021-04-08 NOTE — TELEPHONE ENCOUNTER
Requested Prescriptions     Pending Prescriptions Disp Refills    donepezil (ARICEPT) 5 MG tablet [Pharmacy Med Name: Donepezil HCl 5 MG Oral Tablet] 30 tablet 5     Sig: Take 1 tablet by mouth nightly       Last Office Visit: 3/3/20  Next Office Visit:   Last Medication Refill:3/17/21   Anjali Wang pt

## 2021-04-09 RX ORDER — DONEPEZIL HYDROCHLORIDE 5 MG/1
5 TABLET, FILM COATED ORAL NIGHTLY
Qty: 30 TABLET | Refills: 5 | Status: SHIPPED | OUTPATIENT
Start: 2021-04-09 | End: 2021-06-28 | Stop reason: SDUPTHER

## 2021-04-16 RX ORDER — MEMANTINE HYDROCHLORIDE 10 MG/1
TABLET ORAL
Qty: 60 TABLET | Refills: 0 | Status: SHIPPED | OUTPATIENT
Start: 2021-04-16 | End: 2021-05-20 | Stop reason: SDUPTHER

## 2021-04-16 NOTE — TELEPHONE ENCOUNTER
Requested Prescriptions     Pending Prescriptions Disp Refills    memantine (NAMENDA) 10 MG tablet [Pharmacy Med Name: Memantine HCl 10 MG Oral Tablet] 60 tablet 0     Sig: Take 1 tablet by mouth twice daily       Last Office Visit: 3/3/20  Next Office Visit: Visit date not found  Last Medication Refill: 3/17/21

## 2021-06-02 ENCOUNTER — OFFICE VISIT (OUTPATIENT)
Dept: OBSTETRICS AND GYNECOLOGY | Facility: CLINIC | Age: 76
End: 2021-06-02

## 2021-06-02 VITALS
DIASTOLIC BLOOD PRESSURE: 82 MMHG | HEIGHT: 62 IN | WEIGHT: 168 LBS | BODY MASS INDEX: 30.91 KG/M2 | SYSTOLIC BLOOD PRESSURE: 148 MMHG

## 2021-06-02 DIAGNOSIS — Z78.9 NONSMOKER: ICD-10-CM

## 2021-06-02 DIAGNOSIS — Z78.0 MENOPAUSE: Primary | ICD-10-CM

## 2021-06-02 DIAGNOSIS — E66.9 OBESITY (BMI 30-39.9): ICD-10-CM

## 2021-06-02 DIAGNOSIS — Z12.31 ENCOUNTER FOR SCREENING MAMMOGRAM FOR MALIGNANT NEOPLASM OF BREAST: ICD-10-CM

## 2021-06-02 PROCEDURE — G0101 CA SCREEN;PELVIC/BREAST EXAM: HCPCS | Performed by: OBSTETRICS & GYNECOLOGY

## 2021-06-02 NOTE — PROGRESS NOTES
"Subjective   Chief Complaint   Patient presents with   • Annual Exam     pt here today as new pt for annual exam. pt voices no concerns.      Arlene England is a 75 y.o. year old  menopausal female presenting to be seen for her annual exam.  She is here for the first time to establish care - she had previously been travelling to [place] to see a gynecologist.  Overall, the patient reports to be good.  The patient is status-post hysterectomy - but does not remember why it was done, how it was done, or whether she still has ovaries.  Hot flashes and night sweats are not a significant problem - she reports that they have completely gone away.    She is sexually active.  In the past year there has not been new sexual partners.  She denies any pain or problems  Patient reports regular self breast exams: \"sometimes\"  She exercises regularly: yes.  Yardwork and walking  She has noticed changes in height: not asked.    No Additional Complaints Reported    The following portions of the patient's history were reviewed and updated as appropriate:problem list, current medications, allergies, past family history, past medical history, past social history and past surgical history.  Social History    Tobacco Use      Smoking status: Never Smoker      Smokeless tobacco: Never Used    Review of Systems   Constitutional: Negative for activity change and unexpected weight change.   Respiratory: Negative for shortness of breath.    Cardiovascular: Negative for chest pain.   Gastrointestinal: Negative for abdominal pain, constipation and diarrhea.        Next colonoscopy due 2022   Endocrine: Negative for cold intolerance and heat intolerance.   Genitourinary: Negative for difficulty urinating, dyspareunia, enuresis, pelvic pain, vaginal bleeding, vaginal discharge and vaginal pain.   Musculoskeletal: Negative for arthralgias and back pain.   Neurological: Negative for dizziness and headaches.   Psychiatric/Behavioral: Negative for " "dysphoric mood. The patient is not nervous/anxious.          Objective   /82   Ht 157.5 cm (62\")   Wt 76.2 kg (168 lb)   BMI 30.73 kg/m²   Physical Exam  Vitals and nursing note reviewed. Exam conducted with a chaperone present.   Constitutional:       General: She is not in acute distress.     Appearance: She is well-developed.   HENT:      Head: Normocephalic and atraumatic.   Neck:      Thyroid: No thyromegaly.   Cardiovascular:      Rate and Rhythm: Normal rate and regular rhythm.      Heart sounds: No murmur heard.     Pulmonary:      Effort: Pulmonary effort is normal.      Breath sounds: Normal breath sounds.   Chest:      Breasts:         Right: No inverted nipple or mass.         Left: No inverted nipple or mass.   Abdominal:      General: There is no distension.      Palpations: Abdomen is soft.      Tenderness: There is no abdominal tenderness.   Genitourinary:     General: Normal vulva.      Exam position: Lithotomy position.      Labia:         Right: No tenderness or lesion.         Left: No tenderness or lesion.       Urethra: No prolapse.      Vagina: Normal. No vaginal discharge or bleeding.      Uterus: Absent.       Adnexa:         Right: No tenderness or fullness.          Left: No tenderness or fullness.        Rectum: No external hemorrhoid or internal hemorrhoid. Normal anal tone.      Comments: Patient s/p hysterectomy:  Uterus and cervix surgically absent.  Vaginal cuff unremarkable.  Labia normal, no lesions noted.  Urethral meatus unremarkable, no prolapse of mucosa.  Anus/perineum normal  Musculoskeletal:         General: Normal range of motion.      Cervical back: Normal range of motion and neck supple.   Skin:     General: Skin is warm and dry.   Neurological:      Mental Status: She is alert and oriented to person, place, and time.   Psychiatric:         Behavior: Behavior normal.         Judgment: Judgment normal.            Assessment & Plan    Diagnoses and all orders for " "this visit:    1. Menopause (Primary): Patient reports all vasomotor symptoms are behind her.  She denies vaginal dryness or pain with intercourse.  Patient does have mammogram and bone density testing at the hospital in [place]; orders for both have been sent there.  -     Mammo Screening Bilateral With CAD; Future  -     DEXA Bone Density Axial; Future    2. Encounter for screening mammogram for malignant neoplasm of breast   -     Mammo Screening Bilateral With CAD; Future    3. Obesity (BMI 30-39.9): Patient reports she is exercising \" all the time\"    4. Nonsmoker    5.  Annual Gyn exam: Patient reports that she is healthy and has no complaints.  She reports intermittent self breast exam, and was encouraged to do so more regularly.  Mammogram and bone density testing were both ordered.  Patient reports that her PCP performs all of her routine screening labs.  Patient is status post hysterectomy but cannot remember why it was done, how it was done, or whether or not she still has ovaries.  The patient had multiple medications listed in the computer, but denied that she was taking any of them during medication reconciliation.  Additionally, the patient's  tried to come back after she had been roomed, stating that he would like to be with her because she has some dementia; the patient refused for him to be in the room.  He did report that she had a recent UTI, and wanted us to be aware of that; the patient denied any current or recent symptoms -including burning or frequency.  Suspect the patient does have some level of dementia since she denied every symptom I asked her about and kept repeating that she was \"healthy\".  Patient's colonoscopy is up-to-date, and due to be repeated in August 2022.        This note was electronically signed.    María Culver MD  6/2/2021  10:58 CDT  "

## 2021-06-21 RX ORDER — MEMANTINE HYDROCHLORIDE 10 MG/1
TABLET ORAL
Qty: 60 TABLET | Refills: 0 | Status: SHIPPED | OUTPATIENT
Start: 2021-06-21 | End: 2021-06-28 | Stop reason: SDUPTHER

## 2021-06-21 NOTE — TELEPHONE ENCOUNTER
Requested Prescriptions     Pending Prescriptions Disp Refills    memantine (NAMENDA) 10 MG tablet [Pharmacy Med Name: Memantine HCl 10 MG Oral Tablet] 60 tablet 0     Sig: Take 1 tablet by mouth twice daily       Last Office Visit: 3/3/20  Next Office Visit: 6/28/2021  Last Medication Refill: 5/20/21

## 2021-06-25 ENCOUNTER — TELEPHONE (OUTPATIENT)
Dept: NEUROSURGERY | Age: 76
End: 2021-06-25

## 2021-06-28 ENCOUNTER — OFFICE VISIT (OUTPATIENT)
Dept: NEUROSURGERY | Age: 76
End: 2021-06-28
Payer: MEDICARE

## 2021-06-28 VITALS
WEIGHT: 168 LBS | HEART RATE: 62 BPM | BODY MASS INDEX: 30.91 KG/M2 | HEIGHT: 62 IN | OXYGEN SATURATION: 99 % | DIASTOLIC BLOOD PRESSURE: 82 MMHG | SYSTOLIC BLOOD PRESSURE: 138 MMHG | TEMPERATURE: 98.8 F

## 2021-06-28 DIAGNOSIS — R41.3 MEMORY LOSS: Primary | ICD-10-CM

## 2021-06-28 DIAGNOSIS — Z86.73 HISTORY OF CVA (CEREBROVASCULAR ACCIDENT): ICD-10-CM

## 2021-06-28 PROCEDURE — 99213 OFFICE O/P EST LOW 20 MIN: CPT | Performed by: NURSE PRACTITIONER

## 2021-06-28 RX ORDER — PANTOPRAZOLE SODIUM 40 MG/1
40 TABLET, DELAYED RELEASE ORAL DAILY
Qty: 30 TABLET | Refills: 0 | Status: SHIPPED | OUTPATIENT
Start: 2021-06-28 | End: 2022-02-02

## 2021-06-28 RX ORDER — MEMANTINE HYDROCHLORIDE 10 MG/1
TABLET ORAL
Qty: 60 TABLET | Refills: 0 | Status: SHIPPED | OUTPATIENT
Start: 2021-06-28 | End: 2021-07-09

## 2021-06-28 RX ORDER — DONEPEZIL HYDROCHLORIDE 5 MG/1
5 TABLET, FILM COATED ORAL NIGHTLY
Qty: 30 TABLET | Refills: 5 | Status: SHIPPED | OUTPATIENT
Start: 2021-06-28 | End: 2022-01-25

## 2021-06-28 RX ORDER — DONEPEZIL HYDROCHLORIDE 5 MG/1
5 TABLET, FILM COATED ORAL NIGHTLY
Qty: 30 TABLET | Refills: 5 | Status: CANCELLED | OUTPATIENT
Start: 2021-06-28

## 2021-06-28 NOTE — PROGRESS NOTES
[facility] Neurology Office Note      Patient:   Hiren Brown  MR#:    [id number]  Account Number:                         YOB: 1945  Date of Evaluation:  6/28/2021  Time of Note:                          11:57 AM  Primary/Referring Physician:  Gabriela Milan   Consulting Physician:  Abdirashid Nichols, DEBO, APRN    FOLLOW UP VISIT     Chief Complaint   Patient presents with    Follow-up     pt states memory is about the same    Memory Loss    Medication Refill     anne'd up       [address]    Hiren Brown is a 76y.o. year old female here for follow up of memory loss. Accompanied by her  today. Doing about the same since last visit. No clear progression. Primarily short term in nature. Mostly notices difficulty with remembering names, appointment times. Memory loss does not interfere with ADLs. No issues with driving, still able to handle finances.  is helping with medications. Denies urinary incontinence or gait changes. No tremor. On Namenda and Aricept. Denies further stroke like symptoms. She had a stroke in October 2016, some issues with short term memory since that time, no other residual. Family history with aunt having dementia. No other complaints. Past Medical History:   Diagnosis Date    Anxiety     High cholesterol     HTN (hypertension)     TIA (transient ischemic attack)        Past Surgical History:   Procedure Laterality Date    CATARACT REMOVAL WITH IMPLANT      CHOLECYSTECTOMY      JOINT REPLACEMENT Bilateral     PARTIAL HYSTERECTOMY         History reviewed. No pertinent family history.     Social History     Socioeconomic History    Marital status:      Spouse name: Not on file    Number of children: Not on file    Years of education: Not on file    Highest education level: Not on file   Occupational History    Not on file   Tobacco Use    Smoking status: Never Smoker    Smokeless tobacco: Never Used   Vaping Use    Vaping Use: Never used   Substance and Sexual Activity    Alcohol use: Yes     Comment: occ    Drug use: No    Sexual activity: Not on file   Other Topics Concern    Not on file   Social History Narrative    Not on file     Social Determinants of Health     Financial Resource Strain:     Difficulty of Paying Living Expenses:    Food Insecurity:     Worried About Running Out of Food in the Last Year:     [address] in the Last Year:    Transportation Needs:     Lack of Transportation (Medical):  Lack of Transportation (Non-Medical):    Physical Activity:     Days of Exercise per Week:     Minutes of Exercise per Session:    Stress:     Feeling of Stress :    Social Connections:     Frequency of Communication with Friends and Family:     Frequency of Social Gatherings with Friends and Family:     Attends Congregational Services:     Active Member of Clubs or Organizations:     Attends Club or Organization Meetings:     Marital Status:    Intimate Partner Violence:     Fear of Current or Ex-Partner:     Emotionally Abused:     Physically Abused:     Sexually Abused:        Current Outpatient Medications   Medication Sig Dispense Refill    memantine (NAMENDA) 10 MG tablet Take 1 tablet by mouth twice daily 60 tablet 0    pantoprazole (PROTONIX) 40 MG tablet Take 1 tablet by mouth daily 30 tablet 0    donepezil (ARICEPT) 5 MG tablet Take 1 tablet by mouth nightly 30 tablet 5    lisinopril (PRINIVIL;ZESTRIL) 20 MG tablet Take 20 mg by mouth daily      LORazepam (ATIVAN) 1 MG tablet Take 1 mg by mouth every 8 hours as needed. Giovana Muller aspirin 81 MG tablet Take 1 tablet by mouth daily      rosuvastatin (CRESTOR) 20 MG tablet Take 20 mg by mouth daily      escitalopram (LEXAPRO) 10 MG tablet Take 10 mg by mouth daily       No current facility-administered medications for this visit. Allergies   Allergen Reactions    Sulfa Antibiotics Rash     REVIEW OF SYSTEMS  Constitutional: []? Fever []? Sweats (10/2016)- normal LV size and function. CTA head and neck (10/2016)- no aneurysm or stenosis noted     MRI brain (10/2016)- findings most consistent with a late acute/subacute thalamic lacunar infarct measuring 6mm in size on the left     ASSESSMENT:    Gege Barrera is a 76y.o. year old female here for follow up of memory loss. Last seen over a year ago but no clear progression noted. Still able to complete ADLs without difficulty. Question underlying dementia but could also have vascular component given prior stroke history. On Namenda and Aricept. ICD-10-CM    1. Memory loss  R41.3 memantine (NAMENDA) 10 MG tablet     donepezil (ARICEPT) 5 MG tablet   2. History of CVA (cerebrovascular accident)  Z80.78      PLAN:  1. Continue Namenda 10mg BID and Aricept 5mg nightly. Discussed side effects with patient. Continue to monitor heart rate   2. Continue stroke risk factor maximization- ASA, statin and anti hypertensive   3. Discussed safety concerns, increase supervision, medication monitoring/management. Recommend 30 minutes daily exercise, daily mental stimulation, and healthy diet with fish, fruits, vegetables, fiber and water   4. Monitor blood pressure at home, keep log. Slightly hypertensive today in office but denies headache, chest pain etc.   5. Return in about 1 year (around 6/28/2022) for follow up, sooner if worsening.     Anai Anderson, DEBO, APRN

## 2022-01-24 DIAGNOSIS — R41.3 MEMORY LOSS: ICD-10-CM

## 2022-01-25 RX ORDER — DONEPEZIL HYDROCHLORIDE 5 MG/1
5 TABLET, FILM COATED ORAL NIGHTLY
Qty: 30 TABLET | Refills: 5 | Status: SHIPPED | OUTPATIENT
Start: 2022-01-25 | End: 2022-08-05

## 2022-01-25 NOTE — TELEPHONE ENCOUNTER
Bryan García has requested a refill on her medication.       Last office visit : 6/28/2021   Next office visit : 6/28/2022   Last medication refill : 6/28/2021 w/[facility]      Requested Prescriptions     Pending Prescriptions Disp Refills    donepezil (ARICEPT) 5 MG tablet [Pharmacy Med Name: Donepezil HCl 5 MG Oral Tablet] 30 tablet 0     Sig: Take 1 tablet by mouth nightly

## 2022-02-02 RX ORDER — PANTOPRAZOLE SODIUM 40 MG/1
TABLET, DELAYED RELEASE ORAL
Qty: 30 TABLET | Refills: 5 | Status: SHIPPED | OUTPATIENT
Start: 2022-02-02

## 2022-02-02 NOTE — TELEPHONE ENCOUNTER
Alan Barton has requested a refill on her medication.       Last office visit : 6/28/2021   Next office visit : 6/28/2022   Last medication refill :6/28/2021      Requested Prescriptions     Pending Prescriptions Disp Refills    pantoprazole (PROTONIX) 40 MG tablet [Pharmacy Med Name: Pantoprazole Sodium 40 MG Oral Tablet Delayed Release] 30 tablet 0     Sig: Take 1 tablet by mouth once daily

## 2022-03-03 NOTE — PROGRESS NOTES
"Chief Complaint:   Chief Complaint   Patient presents with   • Loss of appetite     Pt states for the last couple of weeks she \"just has no appetite\"         Patient ID: Arlene England is a 76 y.o. female     History of Present Illness: This is a very pleasant 76-year-old female who is here today with complaints of loss of appetite.    The patient is accompanied by her  who tells me that their son is a PA and while they were on vacation approximately 3 weeks ago he noticed that his mother seems to be eating less food.  The patient tells me she has lost her appetite.  The patient tells me \"I just do not want some food I am just not hungry.\"  Her  relates to me that this is not normal for her and he has noticed a difference.  The patient tells me that she still takes her Protonix 40 mg daily.  The patient has been taking diclofenac 75 mg twice daily for 3 to 4 months. The patient has difficulty with memory on Namenda and Aricept. The patient's last EGD performed on 5/13/2019 with findings of small hiatal hernia and low-grade of narrowing Schatzki's ring dilated otherwise normal.    Last 15 Recorded Weights  View Complete Flowsheet  Weight Weight (kg) Weight (lbs) Weight Method VISIT REPORT   3/4/2022 72.576 kg 160 lb - Report   6/2/2021 76.204 kg 168 lb - Report   5/13/2019 116.574 kg 257 lb - -   4/15/2019 70.761 kg 156 lb - Report   10/29/2018 68.947 kg 152 lb - Report   9/17/2018 68.493 kg 151 lb Standing scale -   8/27/2018 68.04 kg 150 lb - Report   8/14/2017 - - Standing scale -   6/21/2017 64.411 kg 142 lb           Past Medical History:   Diagnosis Date   • Dementia (HCC)    • Dysphagia    • GERD (gastroesophageal reflux disease)    • Hyperlipidemia    • Hypertension    • Stroke (HCC) 2016       Past Surgical History:   Procedure Laterality Date   • CHOLECYSTECTOMY     • COLONOSCOPY N/A 8/14/2017    Diverticulosis in the sigmoid colon and in the descending colon; Examination was otherwise normal on " direct and retroflexion views; No specimens collected; Repeat 5-10 years   • ENDOSCOPY N/A 8/14/2017    Small HH; Normal esophagus; Moderate Schatzki ring-dilated; Normal stomach; Normal duodenum; No specimens collected   • ENDOSCOPY N/A 9/17/2018    Medium-sized HH; LA grade B esophagitis; Low grade of narrowing Schatzki ring-dilated; Normal stomach; Normal duodenum; No specimens collected    • ENDOSCOPY  03/14/2016    Small HH; Schatzki ring-dilated; Normal stomach; Normal examined duodenum; No specimens collected    • ENDOSCOPY N/A 5/13/2019    Small HH; Low-grade of narrowing Schatzki ring-Dilated; Normal stomach; Normal examined duodenum; No specimens collected   • HYSTERECTOMY      pt doesnt remember if her hyst was open or lap. also unsure if she has ovaries   • TOTAL KNEE ARTHROPLASTY Bilateral          Current Outpatient Medications:   •  aspirin 81 MG EC tablet, Take 81 mg by mouth Daily., Disp: , Rfl:   •  diclofenac (VOLTAREN) 75 MG EC tablet, Take 1 tablet by mouth 2 (Two) Times a Day., Disp: , Rfl:   •  donepezil (ARICEPT) 5 MG tablet, Take 1 tablet by mouth Every Night., Disp: , Rfl:   •  escitalopram (LEXAPRO) 10 MG tablet, Take 1 tablet by mouth Daily., Disp: , Rfl:   •  HYDROcodone-acetaminophen (NORCO) 7.5-325 MG per tablet, Take 1 tablet by mouth As Needed., Disp: , Rfl:   •  ibuprofen (ADVIL,MOTRIN) 600 MG tablet, Take 1 tablet by mouth As Needed., Disp: , Rfl:   •  lisinopril (PRINIVIL,ZESTRIL) 20 MG tablet, Take 1 tablet by mouth Daily., Disp: , Rfl:   •  memantine (NAMENDA) 10 MG tablet, Take 10 mg by mouth 2 (Two) Times a Day., Disp: , Rfl:   •  pantoprazole (PROTONIX) 40 MG EC tablet, Take 40 mg by mouth Daily., Disp: , Rfl:   •  rosuvastatin (CRESTOR) 20 MG tablet, Take 20 mg by mouth Daily., Disp: , Rfl:     Allergies   Allergen Reactions   • Sulfa Antibiotics Rash       Social History     Socioeconomic History   • Marital status:    Tobacco Use   • Smoking status: Never Smoker   •  "Smokeless tobacco: Never Used   Vaping Use   • Vaping Use: Never used   Substance and Sexual Activity   • Alcohol use: Not Currently   • Drug use: No   • Sexual activity: Yes     Partners: Male       Family History   Adopted: Yes   Problem Relation Age of Onset   • Colon cancer Neg Hx    • Colon polyps Neg Hx        Vitals:    03/04/22 0834   BP: 128/80   BP Location: Left arm   Patient Position: Sitting   Cuff Size: Adult   Pulse: 74   Temp: 97.6 °F (36.4 °C)   TempSrc: Infrared   SpO2: 97%   Weight: 72.6 kg (160 lb)   Height: 157.5 cm (62\")       Review of Systems:    General:    Present -feeling well   Skin:    Not Present-Rash   HEENT:     Not Present-Acute visual changes or Acute hearing changes   Neck :    Not Present- swollen glands   Genitourinary:      Not Present- burning, frequency, urgency hematuria, dysuria,   Cardiovascular:   Not Present-chest pain, palpitations, or pressure   Respiratory:   Not Present- shortness of breath or cough   Gastrointestinal:  Musculoskeletal:  Neurological:  Psychiatric:   Present as mentioned in the HP    Not Present. Recent gait disturbances.    Not Present-Seizures and weakness in extremities.    Not Present- Anxiety or Depression.       Physical Exam:    General Appearance:    Alert, cooperative, in no acute distress   Psych:    Mood appropriate    Eyes:          conjunctivae and sclerae normal, no   icterus, no pallor   ENMT:    Ears appear intact with no abnormalities noted oral mucosa moist   Neck:   No adenopathy, supple, trachea midline, no thyromegaly, no   carotid bruit, no JVD    Cardiovascular:    Regular rhythm and normal rate, normal S1 and S2, no            murmur, no gallop, no rub, no click   Gastrointestinal:     Inspection normal.  Normal bowel sounds, no masses, no organomegaly, soft round non-tender, non-distended, no guarding, no rebound or tenderness. No hepatosplenomegaly.   Skin:   No bleeding, bruising or rash   Neurologic:   nonfocal       Lab " Results - Last 18 Months   Lab Units 10/26/20  1501   TSH uIU/mL 1.77        Assessment and Plan:  Assessment/Plan   Diagnoses and all orders for this visit:    1. Loss of appetite (Primary)  -     Case Request; Standing  -     Case Request    2. History of gastroesophageal reflux (GERD)  -     Case Request; Standing  -     Case Request    3. Encounter for long-term (current) use of NSAIDs  -     Case Request; Standing  -     Case Request    4. Weight loss  -     Case Request; Standing  -     Case Request    Other orders  -     Follow Anesthesia Guidelines / Protocol; Future  -     Obtain Informed Consent; Future      Will schedule patient for EGD.  Continue on Protonix 40 mg daily at this time and stop diclofenac along with any other NSAIDs.     There are no Patient Instructions on file for this visit.    Next follow-up appointment    The risks, benefits, and alternatives of endoscopy were reviewed with the patient today.  Risks including perforation, with or without dilation, possibly requiring surgery.  Additional risks include risk of bleeding.  There is also the risk of a drug reaction or problems with anesthesia.  This will be discussed with the further by the anesthesia team on the day of the procedure. The benefits include the diagnosis and management of disease of the upper digestive tract.  Alternatives to endoscopy include upper GI series, laboratory testing, radiographic evaluation, or no intervention.  The patient verbalizes understanding and agrees.        EMR Dragon/Transcription disclaimer:  Much of this encounter note is an electronic transcription/translation of spoken language to printed text. The electronic translation of spoken language may permit erroneous, or at times, nonsensical words or phrases to be inadvertently transcribed; although I have reviewed the note for such errors, some may still exist.

## 2022-03-04 ENCOUNTER — OFFICE VISIT (OUTPATIENT)
Dept: GASTROENTEROLOGY | Facility: CLINIC | Age: 77
End: 2022-03-04

## 2022-03-04 ENCOUNTER — TELEPHONE (OUTPATIENT)
Dept: GASTROENTEROLOGY | Facility: CLINIC | Age: 77
End: 2022-03-04

## 2022-03-04 VITALS
SYSTOLIC BLOOD PRESSURE: 128 MMHG | HEART RATE: 74 BPM | TEMPERATURE: 97.6 F | DIASTOLIC BLOOD PRESSURE: 80 MMHG | WEIGHT: 160 LBS | OXYGEN SATURATION: 97 % | BODY MASS INDEX: 29.44 KG/M2 | HEIGHT: 62 IN

## 2022-03-04 DIAGNOSIS — Z79.1 ENCOUNTER FOR LONG-TERM (CURRENT) USE OF NSAIDS: ICD-10-CM

## 2022-03-04 DIAGNOSIS — R63.0 LOSS OF APPETITE: Primary | ICD-10-CM

## 2022-03-04 DIAGNOSIS — R63.4 WEIGHT LOSS: ICD-10-CM

## 2022-03-04 DIAGNOSIS — Z87.19 HISTORY OF GASTROESOPHAGEAL REFLUX (GERD): ICD-10-CM

## 2022-03-04 PROCEDURE — 99214 OFFICE O/P EST MOD 30 MIN: CPT | Performed by: NURSE PRACTITIONER

## 2022-03-04 RX ORDER — DONEPEZIL HYDROCHLORIDE 5 MG/1
1 TABLET, FILM COATED ORAL NIGHTLY
COMMUNITY
Start: 2022-01-25

## 2022-03-04 RX ORDER — LISINOPRIL 20 MG/1
1 TABLET ORAL DAILY
COMMUNITY
Start: 2022-01-25

## 2022-03-04 RX ORDER — MEMANTINE HYDROCHLORIDE 10 MG/1
10 TABLET ORAL 2 TIMES DAILY
COMMUNITY
Start: 2022-01-06

## 2022-03-04 RX ORDER — PANTOPRAZOLE SODIUM 40 MG/1
40 TABLET, DELAYED RELEASE ORAL DAILY
COMMUNITY
Start: 2022-02-02

## 2022-03-04 RX ORDER — HYDROCODONE BITARTRATE AND ACETAMINOPHEN 7.5; 325 MG/1; MG/1
1 TABLET ORAL AS NEEDED
COMMUNITY
Start: 2021-12-02 | End: 2023-03-21

## 2022-03-04 RX ORDER — DICLOFENAC SODIUM 75 MG/1
1 TABLET, DELAYED RELEASE ORAL 2 TIMES DAILY
COMMUNITY
Start: 2022-02-14 | End: 2023-03-21

## 2022-03-04 RX ORDER — IBUPROFEN 600 MG/1
1 TABLET ORAL AS NEEDED
COMMUNITY
Start: 2021-09-24

## 2022-03-04 RX ORDER — ROSUVASTATIN CALCIUM 20 MG/1
20 TABLET, COATED ORAL DAILY
COMMUNITY
Start: 2022-01-25

## 2022-03-04 RX ORDER — ESCITALOPRAM OXALATE 10 MG/1
1 TABLET ORAL DAILY
COMMUNITY
Start: 2021-11-29

## 2022-03-04 RX ORDER — ASPIRIN 81 MG/1
81 TABLET ORAL DAILY
COMMUNITY
End: 2023-03-21

## 2022-03-29 NOTE — TELEPHONE ENCOUNTER
Called pt to confirm egd for tomorrow and spoke with her . I told him I have not received a covid test from anyone yet. He tells me they forgot. I told him I would need them to get a covid test and have that office fax it to me or they won't do the egd. He vu and said he will try to get that done today.

## 2022-03-30 ENCOUNTER — TELEPHONE (OUTPATIENT)
Dept: GASTROENTEROLOGY | Facility: CLINIC | Age: 77
End: 2022-03-30

## 2022-03-30 ENCOUNTER — ANESTHESIA EVENT (OUTPATIENT)
Dept: GASTROENTEROLOGY | Facility: HOSPITAL | Age: 77
End: 2022-03-30

## 2022-03-30 ENCOUNTER — HOSPITAL ENCOUNTER (OUTPATIENT)
Facility: HOSPITAL | Age: 77
Setting detail: HOSPITAL OUTPATIENT SURGERY
Discharge: HOME | End: 2022-03-30
Attending: INTERNAL MEDICINE | Admitting: INTERNAL MEDICINE

## 2022-03-30 ENCOUNTER — ANESTHESIA (OUTPATIENT)
Dept: GASTROENTEROLOGY | Facility: HOSPITAL | Age: 77
End: 2022-03-30

## 2022-03-30 VITALS
HEIGHT: 62 IN | TEMPERATURE: 96.9 F | RESPIRATION RATE: 20 BRPM | OXYGEN SATURATION: 99 % | DIASTOLIC BLOOD PRESSURE: 79 MMHG | HEART RATE: 70 BPM | WEIGHT: 158 LBS | SYSTOLIC BLOOD PRESSURE: 188 MMHG | BODY MASS INDEX: 29.08 KG/M2

## 2022-03-30 PROCEDURE — G0463 HOSPITAL OUTPT CLINIC VISIT: HCPCS | Performed by: INTERNAL MEDICINE

## 2022-03-30 RX ORDER — SODIUM CHLORIDE 0.9 % (FLUSH) 0.9 %
10 SYRINGE (ML) INJECTION AS NEEDED
Status: DISCONTINUED | OUTPATIENT
Start: 2022-03-30 | End: 2022-03-30 | Stop reason: HOSPADM

## 2022-03-30 RX ORDER — SODIUM CHLORIDE 9 MG/ML
500 INJECTION, SOLUTION INTRAVENOUS CONTINUOUS PRN
Status: DISCONTINUED | OUTPATIENT
Start: 2022-03-30 | End: 2022-03-30 | Stop reason: HOSPADM

## 2022-03-30 RX ADMIN — SODIUM CHLORIDE 500 ML: 9 INJECTION, SOLUTION INTRAVENOUS at 08:44

## 2022-03-30 NOTE — TELEPHONE ENCOUNTER
Tried to call pt, but had to leave a vm. Will try to reach out again later if I haven't heard back.

## 2022-07-21 DIAGNOSIS — R41.3 MEMORY LOSS: ICD-10-CM

## 2022-07-21 RX ORDER — MEMANTINE HYDROCHLORIDE 10 MG/1
TABLET ORAL
Qty: 180 TABLET | Refills: 0 | Status: SHIPPED | OUTPATIENT
Start: 2022-07-21 | End: 2022-10-20

## 2022-07-21 NOTE — TELEPHONE ENCOUNTER
Requested Prescriptions     Pending Prescriptions Disp Refills    memantine (NAMENDA) 10 MG tablet [Pharmacy Med Name: Memantine HCl 10 MG Oral Tablet] 180 tablet 0     Sig: Take 1 tablet by mouth twice daily       Last Office Visit: 6/28/2021  Next Office Visit: none  Last Medication Refill:  7/9/21 with 11 RF

## 2022-08-05 DIAGNOSIS — R41.3 MEMORY LOSS: ICD-10-CM

## 2022-08-05 RX ORDER — DONEPEZIL HYDROCHLORIDE 5 MG/1
5 TABLET, FILM COATED ORAL NIGHTLY
Qty: 30 TABLET | Refills: 0 | Status: SHIPPED | OUTPATIENT
Start: 2022-08-05 | End: 2022-09-08

## 2022-08-05 NOTE — TELEPHONE ENCOUNTER
Requested Prescriptions     Pending Prescriptions Disp Refills    donepezil (ARICEPT) 5 MG tablet [Pharmacy Med Name: Donepezil HCl 5 MG Oral Tablet] 30 tablet 0     Sig: Take 1 tablet by mouth nightly       Last Office Visit: 6/28/2021  Next Office Visit: Visit date not found  Last Medication Refill:1/25/22 5 arelis Johnson pt

## 2022-09-08 DIAGNOSIS — R41.3 MEMORY LOSS: ICD-10-CM

## 2022-09-08 RX ORDER — DONEPEZIL HYDROCHLORIDE 5 MG/1
5 TABLET, FILM COATED ORAL NIGHTLY
Qty: 30 TABLET | Refills: 0 | Status: SHIPPED | OUTPATIENT
Start: 2022-09-08 | End: 2022-10-06

## 2022-09-08 NOTE — TELEPHONE ENCOUNTER
Requested Prescriptions     Pending Prescriptions Disp Refills    donepezil (ARICEPT) 5 MG tablet [Pharmacy Med Name: Donepezil HCl 5 MG Oral Tablet] 30 tablet 0     Sig: Take 1 tablet by mouth nightly       Last Office Visit: 6/28/2021  Next Office Visit: none  Last Medication Refill: 8/5/22

## 2022-09-27 ENCOUNTER — TELEPHONE (OUTPATIENT)
Dept: GASTROENTEROLOGY | Facility: CLINIC | Age: 77
End: 2022-09-27

## 2022-09-27 NOTE — TELEPHONE ENCOUNTER
"I have sent 2 letters to pt re: recall colon and rec'd no response. I tried to call today to discuss with pt-was unable to reach them-rec'd message stating \"the number you called is unavailable.\" I will send strike 3 letter to pt and noncompliant letter to PCP.     "

## 2022-10-06 DIAGNOSIS — R41.3 MEMORY LOSS: ICD-10-CM

## 2022-10-06 RX ORDER — DONEPEZIL HYDROCHLORIDE 5 MG/1
5 TABLET, FILM COATED ORAL NIGHTLY
Qty: 30 TABLET | Refills: 0 | Status: SHIPPED | OUTPATIENT
Start: 2022-10-06

## 2022-10-06 NOTE — TELEPHONE ENCOUNTER
Requested Prescriptions     Pending Prescriptions Disp Refills    donepezil (ARICEPT) 5 MG tablet [Pharmacy Med Name: Donepezil HCl 5 MG Oral Tablet] 30 tablet 0     Sig: Take 1 tablet by mouth nightly       Last Office Visit: 6/28/2021  Next Office Visit: Visit date not found  Last Medication Refill: 9/8/2022 with 0 RF       [place] patient

## 2022-10-20 DIAGNOSIS — R41.3 MEMORY LOSS: ICD-10-CM

## 2022-10-20 RX ORDER — MEMANTINE HYDROCHLORIDE 10 MG/1
TABLET ORAL
Qty: 180 TABLET | Refills: 0 | Status: SHIPPED | OUTPATIENT
Start: 2022-10-20

## 2022-10-20 NOTE — TELEPHONE ENCOUNTER
Requested Prescriptions     Pending Prescriptions Disp Refills    memantine (NAMENDA) 10 MG tablet [Pharmacy Med Name: Memantine HCl 10 MG Oral Tablet] 180 tablet 0     Sig: Take 1 tablet by mouth twice daily       Last Office Visit: 6/28/2021  Next Office Visit: 11/30/22  Last Medication Refill: 7/21/22

## 2022-11-12 DIAGNOSIS — R41.3 MEMORY LOSS: ICD-10-CM

## 2022-11-14 RX ORDER — DONEPEZIL HYDROCHLORIDE 5 MG/1
5 TABLET, FILM COATED ORAL NIGHTLY
Qty: 30 TABLET | Refills: 0 | Status: SHIPPED | OUTPATIENT
Start: 2022-11-14

## 2022-11-14 NOTE — TELEPHONE ENCOUNTER
Requested Prescriptions     Pending Prescriptions Disp Refills    donepezil (ARICEPT) 5 MG tablet [Pharmacy Med Name: Donepezil HCl 5 MG Oral Tablet] 30 tablet 0     Sig: Take 1 tablet by mouth nightly       Last Office Visit: 6/28/2021  Next Office Visit: 11/30/22  Last Medication Refill: 10/6/22

## 2022-12-15 DIAGNOSIS — R41.3 MEMORY LOSS: ICD-10-CM

## 2022-12-15 RX ORDER — DONEPEZIL HYDROCHLORIDE 5 MG/1
5 TABLET, FILM COATED ORAL NIGHTLY
Qty: 30 TABLET | Refills: 0 | Status: SHIPPED | OUTPATIENT
Start: 2022-12-15

## 2022-12-15 NOTE — TELEPHONE ENCOUNTER
Requested Prescriptions     Pending Prescriptions Disp Refills    donepezil (ARICEPT) 5 MG tablet [Pharmacy Med Name: Donepezil HCl 5 MG Oral Tablet] 30 tablet 0     Sig: Take 1 tablet by mouth nightly       Last Office Visit: 6/28/2021  Next Office Visit: 2/24/23  Last Medication Refill: 11/14/22

## 2023-01-21 DIAGNOSIS — R41.3 MEMORY LOSS: ICD-10-CM

## 2023-01-23 RX ORDER — DONEPEZIL HYDROCHLORIDE 5 MG/1
5 TABLET, FILM COATED ORAL NIGHTLY
Qty: 30 TABLET | Refills: 0 | Status: SHIPPED | OUTPATIENT
Start: 2023-01-23

## 2023-01-23 NOTE — TELEPHONE ENCOUNTER
Requested Prescriptions     Pending Prescriptions Disp Refills    donepezil (ARICEPT) 5 MG tablet [Pharmacy Med Name: Donepezil HCl 5 MG Oral Tablet] 30 tablet 0     Sig: Take 1 tablet by mouth nightly       Last Office Visit: 6/28/2021  Next Office Visit: Visit date not found  Last Medication Refill: 12/15/2022 with 0 RF

## 2023-02-04 DIAGNOSIS — R41.3 MEMORY LOSS: ICD-10-CM

## 2023-02-06 RX ORDER — MEMANTINE HYDROCHLORIDE 10 MG/1
TABLET ORAL
Qty: 180 TABLET | Refills: 0 | Status: SHIPPED | OUTPATIENT
Start: 2023-02-06

## 2023-02-06 NOTE — TELEPHONE ENCOUNTER
Requested Prescriptions     Pending Prescriptions Disp Refills    memantine (NAMENDA) 10 MG tablet [Pharmacy Med Name: Memantine HCl 10 MG Oral Tablet] 180 tablet 0     Sig: Take 1 tablet by mouth twice daily       Last Office Visit: 6/28/2021  Next Office Visit: Visit date not found  Last Medication Refill: 10/20/2022 with 0 RF

## 2023-02-20 DIAGNOSIS — R41.3 MEMORY LOSS: ICD-10-CM

## 2023-02-21 RX ORDER — DONEPEZIL HYDROCHLORIDE 5 MG/1
5 TABLET, FILM COATED ORAL NIGHTLY
Qty: 30 TABLET | Refills: 0 | Status: SHIPPED | OUTPATIENT
Start: 2023-02-21 | End: 2023-02-24 | Stop reason: SDUPTHER

## 2023-02-21 NOTE — TELEPHONE ENCOUNTER
Requested Prescriptions     Pending Prescriptions Disp Refills    donepezil (ARICEPT) 5 MG tablet [Pharmacy Med Name: Donepezil HCl 5 MG Oral Tablet] 30 tablet 0     Sig: Take 1 tablet by mouth nightly       Last Office Visit: 6/28/2021  Next Office Visit: Visit date not found  Last Medication Refill:1/23/2023 with 0 RF

## 2023-02-24 ENCOUNTER — OFFICE VISIT (OUTPATIENT)
Dept: NEUROLOGY | Age: 78
End: 2023-02-24
Payer: MEDICARE

## 2023-02-24 VITALS
HEIGHT: 62 IN | BODY MASS INDEX: 30.91 KG/M2 | SYSTOLIC BLOOD PRESSURE: 122 MMHG | OXYGEN SATURATION: 96 % | DIASTOLIC BLOOD PRESSURE: 74 MMHG | HEART RATE: 76 BPM | WEIGHT: 168 LBS

## 2023-02-24 DIAGNOSIS — R41.3 MEMORY LOSS: Primary | ICD-10-CM

## 2023-02-24 PROBLEM — R63.4 WEIGHT LOSS: Status: ACTIVE | Noted: 2022-03-04

## 2023-02-24 PROBLEM — K21.9 GASTROESOPHAGEAL REFLUX DISEASE: Status: ACTIVE | Noted: 2019-04-15

## 2023-02-24 PROBLEM — R63.0 LOSS OF APPETITE: Status: ACTIVE | Noted: 2022-03-04

## 2023-02-24 PROCEDURE — 99213 OFFICE O/P EST LOW 20 MIN: CPT | Performed by: NURSE PRACTITIONER

## 2023-02-24 PROCEDURE — 1123F ACP DISCUSS/DSCN MKR DOCD: CPT | Performed by: NURSE PRACTITIONER

## 2023-02-24 RX ORDER — METOPROLOL SUCCINATE 50 MG/1
1 TABLET, EXTENDED RELEASE ORAL NIGHTLY
COMMUNITY
Start: 2023-01-17

## 2023-02-24 RX ORDER — MEMANTINE HYDROCHLORIDE 10 MG/1
TABLET ORAL
Qty: 180 TABLET | Refills: 3 | Status: SHIPPED | OUTPATIENT
Start: 2023-02-24

## 2023-02-24 RX ORDER — DONEPEZIL HYDROCHLORIDE 10 MG/1
10 TABLET, FILM COATED ORAL NIGHTLY
Qty: 90 TABLET | Refills: 3 | Status: SHIPPED | OUTPATIENT
Start: 2023-02-24

## 2023-02-24 RX ORDER — OXYCODONE HYDROCHLORIDE AND ACETAMINOPHEN 5; 325 MG/1; MG/1
1 TABLET ORAL DAILY
COMMUNITY
Start: 2022-12-09

## 2023-02-24 NOTE — PROGRESS NOTES
[facility] Neurology Office Note      Patient:   Millie Nunez  MR#:    [id number]  Account Number:                         YOB: 1945  Date of Evaluation:  2/24/2023  Time of Note:                          11:05 AM  Primary/Referring Physician:  Young Laguna   Consulting Physician:  Kerwin Brantley, DNP, APRN    FOLLOW UP VISIT     Chief Complaint   Patient presents with    1 Year Follow Up    Memory Loss     HISTORY OF PRESENT ILLNESS    Millie Nunez is a 68y.o. year old female here for follow up of memory loss. Accompanied by her  today. Some mild progression noted from prior visit. Primarily short term in nature. Has difficulty with remembering names, appointment times. Memory loss does not interfere with ADLs. She is still driving, although not as often, some concerns with this. Still able to handle finances.  is helping with medications. Denies urinary incontinence or gait changes. No tremor. Denies falls. Denies anxiety/depression. On Namenda and Aricept. Denies further stroke like symptoms. She had a stroke in October 2016, some issues with short term memory since that time, no other residual. Family history with aunt having dementia. Past Medical History:   Diagnosis Date    Anxiety     High cholesterol     HTN (hypertension)     TIA (transient ischemic attack)        Past Surgical History:   Procedure Laterality Date    CATARACT REMOVAL WITH IMPLANT      CHOLECYSTECTOMY      JOINT REPLACEMENT Bilateral     PARTIAL HYSTERECTOMY (CERVIX NOT REMOVED)         History reviewed. No pertinent family history.     Social History     Socioeconomic History    Marital status:      Spouse name: Not on file    Number of children: Not on file    Years of education: Not on file    Highest education level: Not on file   Occupational History    Not on file   Tobacco Use    Smoking status: Never    Smokeless tobacco: Never   Vaping Use    Vaping Use: Never used   Substance and Sexual Activity    Alcohol use: Yes     Comment: occ    Drug use: No    Sexual activity: Not Currently   Other Topics Concern    Not on file   Social History Narrative    Not on file     Social Determinants of Health     Financial Resource Strain: Not on file   Food Insecurity: Not on file   Transportation Needs: Not on file   Physical Activity: Not on file   Stress: Not on file   Social Connections: Not on file   Intimate Partner Violence: Not on file   Housing Stability: Not on file       Current Outpatient Medications   Medication Sig Dispense Refill    metoprolol succinate (TOPROL XL) 50 MG extended release tablet Take 1 tablet by mouth at bedtime      oxyCODONE-acetaminophen (PERCOCET) 5-325 MG per tablet Take 1 tablet by mouth daily. donepezil (ARICEPT) 10 MG tablet Take 1 tablet by mouth nightly 90 tablet 3    memantine (NAMENDA) 10 MG tablet Take 1 tablet twice daily 180 tablet 3    pantoprazole (PROTONIX) 40 MG tablet Take 1 tablet by mouth once daily 30 tablet 5    lisinopril (PRINIVIL;ZESTRIL) 20 MG tablet Take 20 mg by mouth daily      LORazepam (ATIVAN) 1 MG tablet Take 1 mg by mouth every 8 hours as needed. Asaf Gotti aspirin 81 MG tablet Take 1 tablet by mouth daily      rosuvastatin (CRESTOR) 20 MG tablet Take 20 mg by mouth daily      escitalopram (LEXAPRO) 10 MG tablet Take 10 mg by mouth daily       No current facility-administered medications for this visit.        Allergies   Allergen Reactions    Sulfa Antibiotics Rash     REVIEW OF SYSTEMS  Constitutional: []Fever []Sweat []Chills [] Recent Injury [x] Denies all unless marked  HEENT:[]Headache  [] Head Injury/Hearing Loss  [] Sore Throat  [] Ear Ache/Dizziness  [x] Denies all unless marked  Spine:  [] Neck pain  [] Back pain  [] Sciaticia  [x] Denies all unless marked  Cardiovascular:[]Heart Disease []Chest Pain [] Palpitations  [x] Denies all unless marked  Pulmonary: []Shortness of Breath []Cough   [x] Denies all unless marke  Gastrointestinal: []Nausea  []Vomiting  []Abdominal Pain  []Constipation  []Diarrhea  []Dark Bloody Stools  [x] Denies all unless marked  Psychiatric/Behavioral:[] Depression [] Anxiety [x] Denies all unless marked  Genitourinary:   [] Frequency  [] Urgency  [] Incontinence [] Pain with Urination  [x] Denies all unless marked  Extremities: []Pain  []Swelling  [x] Denies all unless marked  Musculoskeletal: [] Muscle Pain  [] Joint Pain  [] Arthritis [] Muscle Cramps [] Muscle Twitches  [x] Denies all unless marked  Sleep: [] Insomnia [] Snoring [] Restless Legs [] Sleep Apnea  [] Daytime Sleepiness  [x] Denies all unless marked  Skin:[] Rash [] Skin Discoloration [x] Denies all unless marked   Neurological: []Visual Disturbance/Memory Loss [] Loss of Balance [] Slurred Speech/Weakness [] Seizures  [] Vertigo/Dizziness [x] Denies all unless marked    The MA has completed the ROS with the patient. I have reviewed it in its' entirety with the patient and agree with the documentation. PHYSICAL EXAM  /74   Pulse 76   Ht 5' 2\" (1.575 m)   Wt 168 lb (76.2 kg)   SpO2 96%   BMI 30.73 kg/m²       Constitutional - No acute distress    HEENT- Conjunctiva normal.  No scars, masses, or lesions over external nose or ears, no neck masses noted, no jugular vein distension, no bruit  Pulmonary- Good expansion, normal effort without use of accessory muscles  Musculoskeletal - No significant wasting of muscles noted, no bony deformities  Extremities - No clubbing, cyanosis or edema  Skin - Warm, dry, and intact. No rash, erythema, or pallor  Psychiatric - Mood, affect, and behavior appear normal      NEUROLOGICAL EXAM     Mental status   [x]Awake, alert, oriented   [x]Affect attention and concentration appear appropriate  [x]Recent and remote memory appears unremarkable  [x]Speech normal without dysarthria or aphasia, comprehension and repetition intact.    COMMENTS: MoCA 27/30    Cranial Nerves [x]No VF deficit to confrontation,  no papilledema on fundoscopic exam.  [x]PERRLA, EOMI, no nystagmus, conjugate eye movements, no ptosis  [x]Face symmetric  [x]Facial sensation intact  [x]Tongue midline no atrophy or fasciculations present  [x]Palate midline, hearing to finger rub normal bilaterally  [x]Shoulder shrug and SCM testing normal bilaterally  COMMENTS:   Motor   [x]5/5 strength x 4 extremities  [x]Normal bulk and tone  [x]No tremor present  [x]No rigidity or bradykinesia noted  COMMENTS:   Sensory  [x]Sensation intact to light touch, pin prick, vibration, and proprioception BLE  [x]Sensation intact to light touch, pin prick, vibration, and proprioception BUE  COMMENTS:   Coordination [x]FTN normal bilaterally   [x]HTS normal bilaterally  [x]JORDY normal bilaterally. COMMENTS:   Reflexes  [x]Symmetric and non-pathological  [x]Toes down going bilaterally  [x]No clonus present  COMMENTS:   Gait                  [x]Normal steady gait    []Ataxic    []Spastic     []Magnetic     []Shuffling  COMMENTS:       LABS RECORD AND IMAGING REVIEW (As below and per HPI)    No results found for: [id number]  No results found for: WBC, HGB, HCT, MCV, PLT  No results found for: NA, K, CL, CO2, BUN, CREATININE, GLUCOSE, CALCIUM, PROT, LABALBU, BILITOT, ALKPHOS, AST, ALT, LABGLOM, GFRAA, AGRATIO, GLOB  No results found for: CHOL, TRIG, HDL, LDLCALC  No results found for: TSH, T4FREE  No results found for: CRP, SEDRATE     PCP records reviewed     Reviewed hospital records     Echocardiogram (10/2016)- normal LV size and function. CTA head and neck (10/2016)- no aneurysm or stenosis noted     MRI brain (10/2016)- findings most consistent with a late acute/subacute thalamic lacunar infarct measuring 6mm in size on the left     ASSESSMENT:    Almita Light is a 68y.o. year old female here for follow up of memory loss. She was last seen over a year ago now. Some progression of symptoms noted.  Question underlying dementia, could have vascular component as well given prior stroke. Will continue Namenda, increase Aricept today. ICD-10-CM    1. Memory loss  R41.3 donepezil (ARICEPT) 10 MG tablet     memantine (NAMENDA) 10 MG tablet        PLAN:  1. Continue Namenda 10mg BID. 2. Increase Aricept to 10mg nightly. Discussed side effects with patient. 3. Continue stroke risk factor maximization- ASA, statin and anti hypertensive   4. Discussed safety concerns, increase supervision, medication monitoring/management. Recommend 30 minutes daily exercise, daily mental stimulation, and healthy diet with fish, fruits, vegetables, fiber and water   5. Return in about 6 months (around 8/24/2023) for follow up, sooner if worsening.     Digna Merrill, DNP, APRN

## 2023-03-15 ENCOUNTER — TELEPHONE (OUTPATIENT)
Dept: NEUROLOGY | Age: 78
End: 2023-03-15

## 2023-03-15 DIAGNOSIS — R41.3 MEMORY LOSS: ICD-10-CM

## 2023-03-16 RX ORDER — DONEPEZIL HYDROCHLORIDE 5 MG/1
5 TABLET, FILM COATED ORAL 2 TIMES DAILY
Qty: 60 TABLET | Refills: 5 | Status: SHIPPED | OUTPATIENT
Start: 2023-03-16 | End: 2023-04-15

## 2023-03-21 ENCOUNTER — OFFICE VISIT (OUTPATIENT)
Dept: OBSTETRICS AND GYNECOLOGY | Facility: CLINIC | Age: 78
End: 2023-03-21
Payer: MEDICARE

## 2023-03-21 VITALS
HEIGHT: 62 IN | SYSTOLIC BLOOD PRESSURE: 156 MMHG | DIASTOLIC BLOOD PRESSURE: 82 MMHG | WEIGHT: 169 LBS | BODY MASS INDEX: 31.1 KG/M2

## 2023-03-21 DIAGNOSIS — Z12.11 SCREENING FOR MALIGNANT NEOPLASM OF COLON: ICD-10-CM

## 2023-03-21 DIAGNOSIS — Z78.0 POSTMENOPAUSAL: ICD-10-CM

## 2023-03-21 DIAGNOSIS — Z12.31 SCREENING MAMMOGRAM FOR BREAST CANCER: ICD-10-CM

## 2023-03-21 DIAGNOSIS — Z01.419 WELL WOMAN EXAM WITH ROUTINE GYNECOLOGICAL EXAM: Primary | ICD-10-CM

## 2023-03-21 PROCEDURE — 99397 PER PM REEVAL EST PAT 65+ YR: CPT | Performed by: OBSTETRICS & GYNECOLOGY

## 2023-03-21 RX ORDER — ERGOCALCIFEROL (VITAMIN D2) 10 MCG
TABLET ORAL
COMMUNITY

## 2023-03-21 NOTE — PROGRESS NOTES
"Subjective   Chief Complaint   Patient presents with   • Gynecologic Exam     Pt here for annual and denies any problems, no prev pap in chart, pt s/p hysterectomy, no prev mammogram or DEXA in chart, last colonoscopy 2017     Arlene England is a 77 y.o. year old  menopausal female presenting to be seen for her annual exam.  Overall, the patient reports to be feeling \"good\".  The patient denies any vaginal bleeding in the past 12 months  Hot flashes and night sweats are not a significant problem - just occasionally.    She is sexually active.  In the past year there has not been new sexual partners.  Patient denies any pain.  Patient reports regular self breast exams: yes  She exercises regularly: yes, likes to walk  She has noticed changes in height: no.    No Additional Complaints Reported    The following portions of the patient's history were reviewed and updated as appropriate:problem list, current medications, allergies, past family history, past medical history, past social history and past surgical history.  Social History    Tobacco Use      Smoking status: Never      Smokeless tobacco: Never    Review of Systems   Constitutional: Negative for activity change, fatigue and unexpected weight change.   Respiratory: Negative for shortness of breath.    Cardiovascular: Negative for chest pain.   Gastrointestinal: Negative for abdominal pain, anal bleeding, constipation and diarrhea.        Referral for colonoscopy placed   Genitourinary: Negative for difficulty urinating, dyspareunia, enuresis (only occasional SAMI with laughing), pelvic pain, vaginal bleeding, vaginal discharge and vaginal pain.   Musculoskeletal: Positive for back pain (low back). Negative for arthralgias.   Neurological: Positive for headaches (just occasionally). Negative for dizziness.   Psychiatric/Behavioral: Negative for dysphoric mood and sleep disturbance. The patient is not nervous/anxious.          Objective   /82 (BP " "Location: Right arm, Patient Position: Sitting, Cuff Size: Adult)   Ht 157.5 cm (62\")   Wt 76.7 kg (169 lb)   LMP  (LMP Unknown)   Breastfeeding No   BMI 30.91 kg/m²   Physical Exam  Vitals and nursing note reviewed. Exam conducted with a chaperone present.   Constitutional:       General: She is not in acute distress.     Appearance: She is well-developed.   HENT:      Head: Normocephalic and atraumatic.   Neck:      Thyroid: No thyromegaly.   Cardiovascular:      Rate and Rhythm: Normal rate and regular rhythm.      Heart sounds: No murmur heard.  Pulmonary:      Effort: Pulmonary effort is normal.      Breath sounds: Normal breath sounds.   Chest:   Breasts:     Right: No inverted nipple or mass.      Left: No inverted nipple or mass.   Abdominal:      General: There is no distension.      Palpations: Abdomen is soft.      Tenderness: There is no abdominal tenderness.   Genitourinary:     General: Normal vulva.      Labia:         Right: No tenderness or lesion.         Left: No tenderness or lesion.       Vagina: Normal. No vaginal discharge or bleeding.      Uterus: Absent.       Rectum: Normal. No external hemorrhoid or internal hemorrhoid. Normal anal tone.      Comments: Labia normal, no lesions noted.  Urethral meatus unremarkable, no prolapse of mucosa.  Mucosa pale, with loss of rugae.  Anus/perineum normal.  Vaginal cuff well-supported.  Musculoskeletal:         General: Normal range of motion.      Cervical back: Normal range of motion and neck supple.   Skin:     General: Skin is warm and dry.   Neurological:      Mental Status: She is alert and oriented to person, place, and time.   Psychiatric:         Behavior: Behavior normal.         Judgment: Judgment normal.            Assessment & Plan    Diagnoses and all orders for this visit:    1. Well woman exam with routine gynecological exam (Primary): Exam unremarkable.  Patient has very few complaints and reports only occasional aches and pains " which are age-related.  Patient is agreeable to referral for screening colonoscopy she is overdue for mammogram density testing; has been placed with PCP, also manages the patient's blood pressure and elevated cholesterol.  Patient is status post hysterectomy, with good cuff support.  Patient denies any bladder complaints and reports SAMI occurs only very rarely with a strong laugh.    2. Screening mammogram for breast cancer  -     Mammo Screening Digital Tomosynthesis Bilateral With CAD    3. Postmenopausal  -     dexa bone density axial    4. Screening for malignant neoplasm of colon  -     Ambulatory Referral For Screening Colonoscopy        This note was electronically signed.    María Culver MD  3/21/2023  14:39 CDT

## 2023-04-27 NOTE — PROGRESS NOTES
REVIEW OF SYSTEMS    Constitutional: []Fever []Sweat []Chills [] Recent Injury [x] Denies all unless marked  HEENT:[]Headache  [] Head Injury/Hearing Loss  [] Sore Throat  [] Ear Ache/Dizziness  [x] Denies all unless marked  Spine:  [] Neck pain  [] Back pain  [] Sciaticia  [x] Denies all unless marked  Cardiovascular:[]Heart Disease []Chest Pain [] Palpitations  [x] Denies all unless marked  Pulmonary: []Shortness of Breath []Cough   [x] Denies all unless marke  Gastrointestinal: []Nausea  []Vomiting  []Abdominal Pain  []Constipation  []Diarrhea  []Dark Bloody Stools  [x] Denies all unless marked  Psychiatric/Behavioral:[] Depression [] Anxiety [x] Denies all unless marked  Genitourinary:   [] Frequency  [] Urgency  [] Incontinence [] Pain with Urination  [x] Denies all unless marked  Extremities: []Pain  []Swelling  [x] Denies all unless marked  Musculoskeletal: [] Muscle Pain  [] Joint Pain  [] Arthritis [] Muscle Cramps [] Muscle Twitches  [x] Denies all unless marked  Sleep: [] Insomnia [] Snoring [] Restless Legs [] Sleep Apnea  [] Daytime Sleepiness  [x] Denies all unless marked  Skin:[] Rash [] Skin Discoloration [x] Denies all unless marked   Neurological: [x]Visual Disturbance/Memory Loss [] Loss of Balance [] Slurred Speech/Weakness [] Seizures  [] Vertigo/Dizziness [] Denies all unless marked
pain  []? Back pain  []? Sciaticia  [x]? Denies all unless marked  Cardiovascular:[]? Heart Disease []? Chest Pain []? Palpitations  [x]? Denies all unless marked  Pulmonary: []? Shortness of Breath []? Cough   [x]? Denies all unless marke  Gastrointestinal: []? Nausea  []? Vomiting  []? Abdominal Pain  []? Constipation  []? Diarrhea  []? Dark Bloody Stools  [x]? Denies all unless marked  Psychiatric/Behavioral:[]? Depression []? Anxiety [x]? Denies all unless marked  Genitourinary:   []? Frequency  []? Urgency  []? Incontinence []? Pain with Urination  [x]? Denies all unless marked  Extremities: []? Pain  []? Swelling  [x]? Denies all unless marked  Musculoskeletal: []? Muscle Pain  []? Joint Pain  []? Arthritis []? Muscle Cramps []? Muscle Twitches  [x]? Denies all unless marked  Sleep: []? Insomnia []? Snoring []? Restless Legs []? Sleep Apnea  []? Daytime Sleepiness  [x]? Denies all unless marked  Skin:[]? Rash []? Skin Discoloration [x]? Denies all unless marked   Neurological: [x]? Visual Disturbance/Memory Loss []? Loss of Balance []? Slurred Speech/Weakness []? Seizures  []? Vertigo/Dizziness []? Denies all unless marked    The MA has completed the ROS with the patient. I have reviewed it in its' entirety with the patient and agree with the documentation. PHYSICAL EXAM  /67   Pulse 63   Ht 5' 2\" (1.575 m)   Wt 168 lb 3.2 oz (76.3 kg)   SpO2 98%   BMI 30.76 kg/m²       Constitutional - No acute distress    HEENT- Conjunctiva normal.  No scars, masses, or lesions over external nose or ears, no neck masses noted, no jugular vein distension, no bruit  Pulmonary- Good expansion, normal effort without use of accessory muscles  Musculoskeletal - No significant wasting of muscles noted, no bony deformities  Extremities - No clubbing, cyanosis or edema  Skin - Warm, dry, and intact.   No rash, erythema, or pallor  Psychiatric - Mood, affect, and behavior appear normal      NEUROLOGICAL EXAM     Mental status
Private car

## 2023-06-02 ENCOUNTER — HOSPITAL ENCOUNTER (OUTPATIENT)
Dept: BONE DENSITY | Facility: HOSPITAL | Age: 78
Discharge: HOME | End: 2023-06-02

## 2023-06-02 ENCOUNTER — HOSPITAL ENCOUNTER (OUTPATIENT)
Dept: MAMMOGRAPHY | Facility: HOSPITAL | Age: 78
Discharge: HOME | End: 2023-06-02

## 2023-06-02 PROCEDURE — 77080 DXA BONE DENSITY AXIAL: CPT

## 2023-06-02 PROCEDURE — 77063 BREAST TOMOSYNTHESIS BI: CPT

## 2023-06-02 PROCEDURE — 77067 SCR MAMMO BI INCL CAD: CPT

## 2023-08-22 ENCOUNTER — OFFICE VISIT (OUTPATIENT)
Dept: OBSTETRICS AND GYNECOLOGY | Facility: CLINIC | Age: 78
End: 2023-08-22
Payer: MEDICARE

## 2023-08-22 VITALS
WEIGHT: 167 LBS | SYSTOLIC BLOOD PRESSURE: 168 MMHG | DIASTOLIC BLOOD PRESSURE: 70 MMHG | HEIGHT: 62 IN | BODY MASS INDEX: 30.73 KG/M2

## 2023-08-22 DIAGNOSIS — Z12.31 BREAST CANCER SCREENING BY MAMMOGRAM: ICD-10-CM

## 2023-08-22 DIAGNOSIS — Z78.0 MENOPAUSE: Primary | ICD-10-CM

## 2023-08-22 DIAGNOSIS — M85.80 OSTEOPENIA, SENILE: ICD-10-CM

## 2023-08-22 RX ORDER — METOPROLOL SUCCINATE 50 MG/1
50 TABLET, EXTENDED RELEASE ORAL DAILY
COMMUNITY
Start: 2023-08-10

## 2023-08-22 NOTE — PROGRESS NOTES
"Subjective   Chief Complaint   Patient presents with    Follow-up     Patient here following up on dexa and mammogram results. No problems or concerns. Denies any questions.      Arlene England is a 78 y.o. year old  menopausal female presenting to be seen for a menopause follow-up from mammogram and DEXA earlier this year; she is accompanied by her .  Patient had a normal mammogram last year and again this year.  DEXA this year showed osteopenia for the first time.  Overall, the patient reports to be feeling \"good\".  The patient denies any vaginal bleeding in the past 12 months  Hot flashes and night sweats are not a significant problem - just occasionally.    She is sexually active.  In the past year there has not been new sexual partners.  Patient denies any pain.  Patient reports regular self breast exams: yes  She exercises regularly: yes, likes to walk  She has noticed changes in height: no.    No Additional Complaints Reported    The following portions of the patient's history were reviewed and updated as appropriate:problem list, current medications, allergies, past family history, past medical history, past social history and past surgical history.  Social History    Tobacco Use      Smoking status: Never      Smokeless tobacco: Never    Review of Systems   Constitutional:  Negative for activity change, fatigue and unexpected weight change.   Respiratory:  Negative for shortness of breath.    Cardiovascular:  Negative for chest pain.   Gastrointestinal:  Negative for abdominal pain, anal bleeding, constipation and diarrhea.        Referral for colonoscopy placed   Genitourinary:  Negative for difficulty urinating, dyspareunia, enuresis (only occasional SAMI with laughing), pelvic pain, vaginal bleeding, vaginal discharge and vaginal pain.   Musculoskeletal:  Positive for back pain (low back). Negative for arthralgias.   Neurological:  Positive for headaches (just occasionally). Negative for " "dizziness.   Psychiatric/Behavioral:  Negative for dysphoric mood and sleep disturbance. The patient is not nervous/anxious.        Objective   /70 (BP Location: Left arm, Patient Position: Sitting, Cuff Size: Adult)   Ht 157.5 cm (62\")   Wt 75.8 kg (167 lb)   LMP  (LMP Unknown)   BMI 30.54 kg/m²   Physical Exam  Vitals and nursing note reviewed.   Constitutional:       General: She is not in acute distress.     Appearance: She is well-developed.   HENT:      Head: Normocephalic and atraumatic.   Neck:      Thyroid: No thyromegaly.   Cardiovascular:      Rate and Rhythm: Normal rate and regular rhythm.      Heart sounds: No murmur heard.  Pulmonary:      Effort: Pulmonary effort is normal.      Breath sounds: Normal breath sounds.   Chest:   Breasts:     Right: No inverted nipple or mass.      Left: No inverted nipple or mass.   Abdominal:      General: There is no distension.      Palpations: Abdomen is soft.      Tenderness: There is no abdominal tenderness.   Genitourinary:     General: Normal vulva.      Uterus: Absent.       Comments: Pelvic exam deferred this year  Musculoskeletal:         General: Normal range of motion.      Cervical back: Normal range of motion and neck supple.   Skin:     General: Skin is warm and dry.   Neurological:      Mental Status: She is alert and oriented to person, place, and time.   Psychiatric:         Behavior: Behavior normal.         Judgment: Judgment normal.          Assessment & Plan    Diagnoses and all orders for this visit:    1. Menopause: Exam unremarkable.  Patient has very few complaints and reports only occasional aches and pains which are age-related.   Patient is status post hysterectomy, with good cuff support on exam last year.  Pelvic exam deferred.  Patient denies any bladder complaints and reports SAMI occurs only very rarely with a strong laugh.  Referral for colonoscopy placed last year.  Routine screening labs with PCP.    2. Screening mammogram " for breast cancer: Patient had a normal mammogram earlier this year.  We will continue annual mammograms.  Need for regular self breast exam reinforced.  -     Mammo Screening Digital Tomosynthesis Bilateral With CAD    3. Postmenopausal    4.  Osteopenia diagnosed on DEXA 2023: Patient with new osteopenia on DEXA earlier this year.  We have discussed importance of daily supplementation with calcium and vitamin D.  Benefits of weightbearing exercise also reviewed.  We will plan to repeat study in 2 years.              This note was electronically signed.    María Culver MD  8/22/2023  15:42 CDT

## 2023-08-28 ENCOUNTER — OFFICE VISIT (OUTPATIENT)
Dept: NEUROLOGY | Age: 78
End: 2023-08-28
Payer: MEDICARE

## 2023-08-28 VITALS
WEIGHT: 168 LBS | OXYGEN SATURATION: 100 % | DIASTOLIC BLOOD PRESSURE: 72 MMHG | HEIGHT: 62 IN | SYSTOLIC BLOOD PRESSURE: 164 MMHG | HEART RATE: 55 BPM | BODY MASS INDEX: 30.91 KG/M2

## 2023-08-28 DIAGNOSIS — R41.3 MEMORY LOSS: Primary | ICD-10-CM

## 2023-08-28 PROCEDURE — 99213 OFFICE O/P EST LOW 20 MIN: CPT | Performed by: NURSE PRACTITIONER

## 2023-08-28 PROCEDURE — 1123F ACP DISCUSS/DSCN MKR DOCD: CPT | Performed by: NURSE PRACTITIONER

## 2023-08-28 RX ORDER — MEMANTINE HYDROCHLORIDE 10 MG/1
TABLET ORAL
Qty: 180 TABLET | Refills: 3 | Status: SHIPPED | OUTPATIENT
Start: 2023-08-28

## 2023-08-28 RX ORDER — DONEPEZIL HYDROCHLORIDE 5 MG/1
5 TABLET, FILM COATED ORAL 2 TIMES DAILY
Qty: 180 TABLET | Refills: 3 | Status: SHIPPED | OUTPATIENT
Start: 2023-08-28 | End: 2024-08-22

## 2023-08-28 NOTE — PROGRESS NOTES
REVIEW OF SYSTEMS    Constitutional: []Fever []Sweats []Chills [] Recent Injury [x] Denies all unless marked  HEENT:[]Headache  [] Head Injury [] Hearing Loss  [] Sore Throat  [] Ear Ache [x] Denies all unless marked  Spine:  [] Neck pain  [] Back pain  [] Sciaticia  [x] Denies all unless marked  Cardiovascular:[]Heart Disease []Palpitations [] Chest Pain   [x] Denies all unless marked  Pulmonary: []Shortness of Breath []Cough   [x] Denies all unless marked  Psychiatric/Behavioral:[] Depression [] Anxiety [x] Denies all unless marked  Gastrointestinal: []Nausea  []Vomiting  []Abdominal Pain  []Constipation  []Diarrhea  [x] Denies all unless marked  Genitourinary:   [] Frequency  [] Urgency  [] Dysuria [] Incontinence  [x] Denies all unless marked  Extremities: []Pain  []Swelling  [x] Denies all unless marked  Musculoskeletal: [] Myalgias  [] Joint Pain  [] Arthritis [] Muscle Cramps [] Muscle Twitches  [x] Denies all unless marked  Sleep: []Insomnia[]Snoring []Restless Legs  []Sleep Apnea  []Daytime Sleepiness  [x] Denies all unless marked  Skin:[] Rash [] Color Change [x] Denies all unless marked   Neurological:[]Visual Disturbance [x] Memory Loss []Loss of Balance []Slurred Speech []Weakness []Seizures  [] Dizziness [x] Denies all unless marked
history, question underlying dementia, could have vascular component as well. On Namenda and Aricept, dosing is maximized. No clear progression, will continue same therapy. ICD-10-CM    1. Memory loss  R41.3 donepezil (ARICEPT) 5 MG tablet     memantine (NAMENDA) 10 MG tablet        PLAN:  1. Continue Namenda and Aricept   2. Continue stroke risk factor maximization- ASA, statin and anti hypertensive   3. Discussed safety concerns, increase supervision, medication monitoring/management. Recommend 30 minutes daily exercise, daily mental stimulation, and healthy diet with fish, fruits, vegetables, fiber and water   4. Return in about 6 months (around 2/28/2024) for follow up, sooner if worsening. Arina Horton, DNP, APRN    This dictation was generated by voice recognition computer software. Although all attempts are made to edit the dictation for accuracy, there may be errors in the transcription that are not intended.

## 2023-08-30 ENCOUNTER — TELEPHONE (OUTPATIENT)
Dept: OBSTETRICS AND GYNECOLOGY | Facility: CLINIC | Age: 78
End: 2023-08-30
Payer: MEDICARE

## 2023-08-30 DIAGNOSIS — Z12.31 BREAST CANCER SCREENING BY MAMMOGRAM: Primary | ICD-10-CM

## 2023-08-30 NOTE — TELEPHONE ENCOUNTER
[facility] is needing an order corrected. The org order was placed as a mammo screening bilateral but they are needing a Mammo Screening Digital Tomosynthesis Bilateral With CAD. Once corrected I will route back for scheduling.

## 2024-02-28 ENCOUNTER — OFFICE VISIT (OUTPATIENT)
Dept: NEUROLOGY | Age: 79
End: 2024-02-28
Payer: MEDICARE

## 2024-02-28 VITALS
HEIGHT: 62 IN | OXYGEN SATURATION: 99 % | HEART RATE: 51 BPM | WEIGHT: 168 LBS | BODY MASS INDEX: 30.91 KG/M2 | SYSTOLIC BLOOD PRESSURE: 147 MMHG | DIASTOLIC BLOOD PRESSURE: 70 MMHG

## 2024-02-28 DIAGNOSIS — R41.3 MEMORY LOSS: Primary | ICD-10-CM

## 2024-02-28 PROCEDURE — 1123F ACP DISCUSS/DSCN MKR DOCD: CPT | Performed by: NURSE PRACTITIONER

## 2024-02-28 PROCEDURE — 99213 OFFICE O/P EST LOW 20 MIN: CPT | Performed by: NURSE PRACTITIONER

## 2024-02-28 RX ORDER — DONEPEZIL HYDROCHLORIDE 5 MG/1
5 TABLET, FILM COATED ORAL NIGHTLY
Qty: 30 TABLET | Refills: 11 | Status: SHIPPED | OUTPATIENT
Start: 2024-02-28 | End: 2025-02-22

## 2024-02-28 NOTE — PROGRESS NOTES
REVIEW OF SYSTEMS    Constitutional: []Fever []Sweats []Chills [] Recent Injury [x] Denies all unless marked  HEENT:[]Headache  [] Head Injury [] Hearing Loss  [] Sore Throat  [] Ear Ache [x] Denies all unless marked  Spine:  [] Neck pain  [] Back pain  [] Sciaticia  [x] Denies all unless marked  Cardiovascular:[]Heart Disease []Palpitations [] Chest Pain   [x] Denies all unless marked  Pulmonary: []Shortness of Breath []Cough   [x] Denies all unless marked  Psychiatric/Behavioral:[] Depression [] Anxiety [x] Denies all unless marked  Gastrointestinal: []Nausea  []Vomiting  []Abdominal Pain  []Constipation  []Diarrhea  [x] Denies all unless marked  Genitourinary:   [] Frequency  [] Urgency  [] Dysuria [] Incontinence  [x] Denies all unless marked  Extremities: []Pain  []Swelling  [x] Denies all unless marked  Musculoskeletal: [] Myalgias  [] Joint Pain  [] Arthritis [] Muscle Cramps [] Muscle Twitches  [x] Denies all unless marked  Sleep: []Insomnia[]Snoring []Restless Legs  []Sleep Apnea  []Daytime Sleepiness  [x] Denies all unless marked  Skin:[] Rash [] Color Change [x] Denies all unless marked   Neurological:[]Visual Disturbance [] Memory Loss []Loss of Balance []Slurred Speech []Weakness []Seizures  [] Dizziness [x] Denies all unless marked      
Back pain  [] Sciaticia  [x] Denies all unless marked  Cardiovascular:[]Heart Disease []Palpitations [] Chest Pain   [x] Denies all unless marked  Pulmonary: []Shortness of Breath []Cough   [x] Denies all unless marked  Psychiatric/Behavioral:[] Depression [] Anxiety [x] Denies all unless marked  Gastrointestinal: []Nausea  []Vomiting  []Abdominal Pain  []Constipation  []Diarrhea  [x] Denies all unless marked  Genitourinary:   [] Frequency  [] Urgency  [] Dysuria [] Incontinence  [x] Denies all unless marked  Extremities: []Pain  []Swelling  [x] Denies all unless marked  Musculoskeletal: [] Myalgias  [] Joint Pain  [] Arthritis [] Muscle Cramps [] Muscle Twitches  [x] Denies all unless marked  Sleep: []Insomnia[]Snoring []Restless Legs  []Sleep Apnea  []Daytime Sleepiness  [x] Denies all unless marked  Skin:[] Rash [] Color Change [x] Denies all unless marked   Neurological:[]Visual Disturbance [] Memory Loss []Loss of Balance []Slurred Speech []Weakness []Seizures  [] Dizziness [x] Denies all unless marked    The MA has completed the ROS with the patient. I have reviewed it in its' entirety with the patient and agree with the documentation.     PHYSICAL EXAM  BP (!) 147/70   Pulse 51   Ht 1.575 m (5' 2\")   Wt 76.2 kg (168 lb)   SpO2 99%   BMI 30.73 kg/m²       Constitutional - No acute distress    HEENT- Conjunctiva normal.  No scars, masses, or lesions over external nose or ears, no neck masses noted, no jugular vein distension, no bruit  Pulmonary- Good expansion, normal effort without use of accessory muscles  Musculoskeletal - No significant wasting of muscles noted, no bony deformities  Extremities - No clubbing, cyanosis or edema  Skin - Warm, dry, and intact.  No rash, erythema, or pallor  Psychiatric - Mood, affect, and behavior appear normal      NEUROLOGICAL EXAM     Mental status   [x]Awake, alert, oriented   [x]Affect attention and concentration appear appropriate  [x]Recent and remote memory

## 2024-08-28 ENCOUNTER — OFFICE VISIT (OUTPATIENT)
Dept: NEUROLOGY | Age: 79
End: 2024-08-28
Payer: MEDICARE

## 2024-08-28 VITALS
BODY MASS INDEX: 30.73 KG/M2 | RESPIRATION RATE: 18 BRPM | SYSTOLIC BLOOD PRESSURE: 132 MMHG | DIASTOLIC BLOOD PRESSURE: 80 MMHG | WEIGHT: 167 LBS | HEIGHT: 62 IN

## 2024-08-28 DIAGNOSIS — R41.3 MEMORY LOSS: ICD-10-CM

## 2024-08-28 PROCEDURE — 1123F ACP DISCUSS/DSCN MKR DOCD: CPT | Performed by: NURSE PRACTITIONER

## 2024-08-28 PROCEDURE — 99213 OFFICE O/P EST LOW 20 MIN: CPT | Performed by: NURSE PRACTITIONER

## 2024-08-28 RX ORDER — MEMANTINE HYDROCHLORIDE 10 MG/1
TABLET ORAL
Qty: 180 TABLET | Refills: 3 | Status: SHIPPED | OUTPATIENT
Start: 2024-08-28

## 2024-08-28 NOTE — PROGRESS NOTES
[facility] Office Note      Patient:   Sintia Shipman  MR#:    [id number]  Account Number:                         YOB: 1945  Date of Evaluation:  8/28/2024  Time of Note:                          2:27 PM  Primary/Referring Physician:  Mahamed Perdue MD   Consulting Physician:  Domonique Guardado, DEBO, APRN    FOLLOW UP VISIT     Chief Complaint   Patient presents with    Memory Loss     Patient states memory loss is no better     HISTORY OF PRESENT ILLNESS    Sintia Shipman is a 79 y.o. year old female here for follow up of memory loss. Accompanied by her  today, he helps provide some of the history. No clear progression from prior visit although has had overall progression over the last year now. Notes primarily short term memory loss. Does not carry on conversation as much today. Has difficulty with remembering names, appointment times. Memory loss does not interfere with ADLs. She is still driving, although not as often, some concerns with this. Still able to handle finances.  is giving her medications to her/monitoring medications. Appetite is good, somewhat decreased recently, no weight loss. Sleeping well at night. Denies urinary incontinence or gait changes. No tremor. Denies falls. Denies anxiety/depression. On Namenda and Aricept. Denies further stroke like symptoms. She had a stroke in October 2016, some issues with short term memory since that time, no other residual. Family history with aunt having dementia.      Past Medical History:   Diagnosis Date    Anxiety     High cholesterol     HTN (hypertension)     TIA (transient ischemic attack)        Past Surgical History:   Procedure Laterality Date    CATARACT REMOVAL WITH IMPLANT      CHOLECYSTECTOMY      JOINT REPLACEMENT Bilateral     PARTIAL HYSTERECTOMY (CERVIX NOT REMOVED)         History reviewed. No pertinent family history.    Social History     Socioeconomic History    Marital status:      Spouse name: Not on  file    Number of children: Not on file    Years of education: Not on file    Highest education level: Not on file   Occupational History    Not on file   Tobacco Use    Smoking status: Never    Smokeless tobacco: Never   Vaping Use    Vaping status: Never Used   Substance and Sexual Activity    Alcohol use: Yes     Comment: occ    Drug use: No    Sexual activity: Not Currently   Other Topics Concern    Not on file   Social History Narrative    Not on file     Social Determinants of Health     Financial Resource Strain: Not on file   Food Insecurity: Not on file   Transportation Needs: Not on file   Physical Activity: Not on file   Stress: Not on file   Social Connections: Unknown (10/11/2023)    Received from [facility]    Family and Community Support     Help with Day-to-Day Activities: Not on file     Lonely or Isolated: Not on file   Intimate Partner Violence: Unknown (10/11/2023)    Received from [facility]    Abuse Screen     Unsafe at Home or Work/School: Not on file     Feels Threatened by Someone?: Not on file     Does Anyone Keep You from Contacting Others or Doint Things Outside the Home?: Not on file     Physical Sign of Abuse Present: Not on file   Housing Stability: Unknown (10/11/2023)    Received from [facility]    Housing Stability     Current Living Arrangements: Not on file     Potentially Unsafe Housing Conditions: Not on file       Current Outpatient Medications   Medication Sig Dispense Refill    memantine (NAMENDA) 10 MG tablet Take 1 tablet twice daily 180 tablet 3    donepezil (ARICEPT) 5 MG tablet Take 1 tablet by mouth nightly 30 tablet 11    metoprolol succinate (TOPROL XL) 50 MG extended release tablet Take 1 tablet by mouth at bedtime      oxyCODONE-acetaminophen (PERCOCET) 5-325 MG per tablet Take 1 tablet by mouth daily.      pantoprazole (PROTONIX) 40 MG

## 2024-09-09 DIAGNOSIS — R41.3 MEMORY LOSS: ICD-10-CM

## 2024-09-09 RX ORDER — DONEPEZIL HYDROCHLORIDE 5 MG/1
5 TABLET, FILM COATED ORAL 2 TIMES DAILY
Qty: 180 TABLET | Refills: 0 | Status: SHIPPED | OUTPATIENT
Start: 2024-09-09

## 2024-10-28 NOTE — TELEPHONE ENCOUNTER
She left before her procedure was sent because she was tired of waiting.  Please touch base with her at some point to see if she will reschedule.  If she reschedules, offer her a spot earlier in the day so she will not be at risk for being delayed.    Lindsay Blair MD    Dapsone Pregnancy And Lactation Text: This medication is Pregnancy Category C and is not considered safe during pregnancy or breast feeding.

## 2024-12-10 DIAGNOSIS — R41.3 MEMORY LOSS: ICD-10-CM

## 2024-12-11 RX ORDER — DONEPEZIL HYDROCHLORIDE 5 MG/1
5 TABLET, FILM COATED ORAL 2 TIMES DAILY
Qty: 180 TABLET | Refills: 0 | Status: SHIPPED | OUTPATIENT
Start: 2024-12-11

## 2024-12-11 NOTE — TELEPHONE ENCOUNTER
Requested Prescriptions     Pending Prescriptions Disp Refills    donepezil (ARICEPT) 5 MG tablet [Pharmacy Med Name: Donepezil HCl 5 MG Oral Tablet] 180 tablet 0     Sig: Take 1 tablet by mouth twice daily       Last Office Visit: 8/28/2024  Next Office Visit: 2/26/2025  Last Medication Refill: 9/9/2024 with 0 RF

## 2025-02-26 ENCOUNTER — OFFICE VISIT (OUTPATIENT)
Dept: NEUROLOGY | Age: 80
End: 2025-02-26
Payer: MEDICARE

## 2025-02-26 VITALS
BODY MASS INDEX: 30.73 KG/M2 | SYSTOLIC BLOOD PRESSURE: 141 MMHG | OXYGEN SATURATION: 97 % | HEIGHT: 62 IN | WEIGHT: 167 LBS | DIASTOLIC BLOOD PRESSURE: 66 MMHG | HEART RATE: 64 BPM

## 2025-02-26 DIAGNOSIS — R41.3 MEMORY LOSS: Primary | ICD-10-CM

## 2025-02-26 PROCEDURE — 99213 OFFICE O/P EST LOW 20 MIN: CPT | Performed by: NURSE PRACTITIONER

## 2025-02-26 PROCEDURE — 1160F RVW MEDS BY RX/DR IN RCRD: CPT | Performed by: NURSE PRACTITIONER

## 2025-02-26 PROCEDURE — 1123F ACP DISCUSS/DSCN MKR DOCD: CPT | Performed by: NURSE PRACTITIONER

## 2025-02-26 PROCEDURE — 1159F MED LIST DOCD IN RCRD: CPT | Performed by: NURSE PRACTITIONER

## 2025-02-26 RX ORDER — DONEPEZIL HYDROCHLORIDE 5 MG/1
5 TABLET, FILM COATED ORAL NIGHTLY
Qty: 90 TABLET | Refills: 3 | Status: SHIPPED | OUTPATIENT
Start: 2025-02-26

## 2025-02-26 NOTE — PROGRESS NOTES

## 2025-02-26 NOTE — PROGRESS NOTES
[facility] Office Note      Patient:   Sintia Shipman  MR#:    [id number]  Account Number:                         YOB: 1945  Date of Evaluation:  2/26/2025  Time of Note:                          3:57 PM  Primary/Referring Physician:  Mahamed Perdue MD   Consulting Physician:  Domonique Guardado, DEBO, APRN    FOLLOW UP VISIT     Chief Complaint   Patient presents with    Follow-up    Memory Loss     Pt and family states things are about the same     HISTORY OF PRESENT ILLNESS    Sintia Shipman is a 79 y.o. year old female here for follow up of memory loss. She is accompanied by her  today who helps provide some of the history. No clear progression from prior visit although has had overall progression over the last year now. Notes primarily short term memory loss. Does not carry on conversation as much today. Has difficulty with remembering names, appointment times. Memory loss does not interfere with ADLs. She is no longer driving, there were concerns regarding this. Her  is primarily handling the finances now.  is giving her medications to her/monitoring medications. Appetite is good, somewhat decreased recently, no weight loss. Sleeping well at night. Denies urinary incontinence or gait changes. No tremor. Denies falls. Denies anxiety/depression. On Namenda 10mg BID, Aricept 5mg daily. Had issues with higher dosing of Aricept. Denies further stroke like symptoms. She had a stroke in October 2016, some issues with short term memory since that time, no other residual. Family history with aunt having dementia.      Past Medical History:   Diagnosis Date    Anxiety     High cholesterol     HTN (hypertension)     TIA (transient ischemic attack)        Past Surgical History:   Procedure Laterality Date    CATARACT REMOVAL WITH IMPLANT      CHOLECYSTECTOMY      JOINT REPLACEMENT Bilateral     PARTIAL HYSTERECTOMY (CERVIX NOT REMOVED)         History reviewed. No pertinent family

## 2025-08-25 ENCOUNTER — TELEPHONE (OUTPATIENT)
Dept: NEUROLOGY | Age: 80
End: 2025-08-25

## (undated) DEVICE — YANKAUER,BULB TIP WITH VENT: Brand: ARGYLE

## (undated) DEVICE — CUFF,BP,DISP,1 TUBE,ADULT,HP: Brand: MEDLINE

## (undated) DEVICE — THE CHANNEL CLEANING BRUSH IS A NYLON FLEXI BRUSH ATTACHED TO A FLEXIBLE PLASTIC SHEATH DESIGNED TO SAFELY REMOVE DEBRIS FROM FLEXIBLE ENDOSCOPES.

## (undated) DEVICE — CONMED SCOPE SAVER BITE BLOCK, 20X27 MM: Brand: SCOPE SAVER

## (undated) DEVICE — ESOPHAGEAL BALLOON DILATATION CATHETER: Brand: CRE FIXED WIRE

## (undated) DEVICE — Device: Brand: DEFENDO AIR/WATER/SUCTION AND BIOPSY VALVE

## (undated) DEVICE — SENSR O2 OXIMAX FNGR A/ 18IN NONSTR

## (undated) DEVICE — MSK O2 MD CONCENTR A/ LF 7FT 1P/U

## (undated) DEVICE — DEV INFL ALLIANCE2 SYS

## (undated) DEVICE — TBG SMPL FLTR LINE NASL 02/C02 A/ BX/100

## (undated) DEVICE — ENDOGATOR AUXILIARY WATER JET CONNECTOR: Brand: ENDOGATOR